# Patient Record
Sex: FEMALE | Race: WHITE | NOT HISPANIC OR LATINO | Employment: UNEMPLOYED | URBAN - METROPOLITAN AREA
[De-identification: names, ages, dates, MRNs, and addresses within clinical notes are randomized per-mention and may not be internally consistent; named-entity substitution may affect disease eponyms.]

---

## 2018-12-10 ENCOUNTER — APPOINTMENT (OUTPATIENT)
Dept: RADIOLOGY | Facility: MEDICAL CENTER | Age: 48
DRG: 552 | End: 2018-12-10
Attending: EMERGENCY MEDICINE

## 2018-12-10 ENCOUNTER — HOSPITAL ENCOUNTER (INPATIENT)
Facility: MEDICAL CENTER | Age: 48
LOS: 7 days | DRG: 552 | End: 2018-12-17
Attending: EMERGENCY MEDICINE | Admitting: INTERNAL MEDICINE

## 2018-12-10 DIAGNOSIS — R29.898 WEAKNESS OF BOTH LOWER EXTREMITIES: ICD-10-CM

## 2018-12-10 DIAGNOSIS — E66.2 OBESITY HYPOVENTILATION SYNDROME (HCC): ICD-10-CM

## 2018-12-10 DIAGNOSIS — R09.02 HYPOXIA: ICD-10-CM

## 2018-12-10 DIAGNOSIS — Z86.718 HISTORY OF DVT (DEEP VEIN THROMBOSIS): ICD-10-CM

## 2018-12-10 DIAGNOSIS — R53.1 WEAKNESS: ICD-10-CM

## 2018-12-10 DIAGNOSIS — M48.062 SPINAL STENOSIS OF LUMBAR REGION WITH NEUROGENIC CLAUDICATION: ICD-10-CM

## 2018-12-10 DIAGNOSIS — G47.33 OBSTRUCTIVE SLEEP APNEA SYNDROME: ICD-10-CM

## 2018-12-10 PROBLEM — G47.00 INSOMNIA: Status: ACTIVE | Noted: 2018-12-10

## 2018-12-10 PROBLEM — I48.91 AF (ATRIAL FIBRILLATION) (HCC): Status: ACTIVE | Noted: 2018-12-10

## 2018-12-10 PROBLEM — E03.9 HYPOTHYROIDISM: Status: ACTIVE | Noted: 2018-12-10

## 2018-12-10 PROBLEM — F32.A DEPRESSION: Status: ACTIVE | Noted: 2018-12-10

## 2018-12-10 PROBLEM — M48.00 SPINAL STENOSIS: Status: ACTIVE | Noted: 2018-12-10

## 2018-12-10 LAB
ALBUMIN SERPL BCP-MCNC: 4.2 G/DL (ref 3.2–4.9)
ALBUMIN/GLOB SERPL: 1.5 G/DL
ALP SERPL-CCNC: 81 U/L (ref 30–99)
ALT SERPL-CCNC: 16 U/L (ref 2–50)
ANION GAP SERPL CALC-SCNC: 8 MMOL/L (ref 0–11.9)
AST SERPL-CCNC: 17 U/L (ref 12–45)
BASOPHILS # BLD AUTO: 1.1 % (ref 0–1.8)
BASOPHILS # BLD: 0.09 K/UL (ref 0–0.12)
BILIRUB SERPL-MCNC: 0.3 MG/DL (ref 0.1–1.5)
BUN SERPL-MCNC: 11 MG/DL (ref 8–22)
CALCIUM SERPL-MCNC: 9.7 MG/DL (ref 8.5–10.5)
CHLORIDE SERPL-SCNC: 104 MMOL/L (ref 96–112)
CO2 SERPL-SCNC: 27 MMOL/L (ref 20–33)
CREAT SERPL-MCNC: 0.78 MG/DL (ref 0.5–1.4)
EOSINOPHIL # BLD AUTO: 0.15 K/UL (ref 0–0.51)
EOSINOPHIL NFR BLD: 1.8 % (ref 0–6.9)
ERYTHROCYTE [DISTWIDTH] IN BLOOD BY AUTOMATED COUNT: 44.3 FL (ref 35.9–50)
GLOBULIN SER CALC-MCNC: 2.8 G/DL (ref 1.9–3.5)
GLUCOSE SERPL-MCNC: 105 MG/DL (ref 65–99)
HCT VFR BLD AUTO: 42 % (ref 37–47)
HCT VFR BLD AUTO: 43.6 % (ref 37–47)
HGB BLD-MCNC: 13.7 G/DL (ref 12–16)
HGB BLD-MCNC: 13.9 G/DL (ref 12–16)
IMM GRANULOCYTES # BLD AUTO: 0.03 K/UL (ref 0–0.11)
IMM GRANULOCYTES NFR BLD AUTO: 0.4 % (ref 0–0.9)
LYMPHOCYTES # BLD AUTO: 1.73 K/UL (ref 1–4.8)
LYMPHOCYTES NFR BLD: 20.4 % (ref 22–41)
MAGNESIUM SERPL-MCNC: 1.7 MG/DL (ref 1.5–2.5)
MCH RBC QN AUTO: 29.8 PG (ref 27–33)
MCHC RBC AUTO-ENTMCNC: 31.9 G/DL (ref 33.6–35)
MCV RBC AUTO: 93.6 FL (ref 81.4–97.8)
MONOCYTES # BLD AUTO: 0.65 K/UL (ref 0–0.85)
MONOCYTES NFR BLD AUTO: 7.7 % (ref 0–13.4)
NEUTROPHILS # BLD AUTO: 5.82 K/UL (ref 2–7.15)
NEUTROPHILS NFR BLD: 68.6 % (ref 44–72)
NRBC # BLD AUTO: 0 K/UL
NRBC BLD-RTO: 0 /100 WBC
PLATELET # BLD AUTO: 279 K/UL (ref 164–446)
PMV BLD AUTO: 9.6 FL (ref 9–12.9)
POTASSIUM SERPL-SCNC: 3.8 MMOL/L (ref 3.6–5.5)
PROT SERPL-MCNC: 7 G/DL (ref 6–8.2)
RBC # BLD AUTO: 4.66 M/UL (ref 4.2–5.4)
SODIUM SERPL-SCNC: 139 MMOL/L (ref 135–145)
TSH SERPL DL<=0.005 MIU/L-ACNC: 23.05 UIU/ML (ref 0.38–5.33)
WBC # BLD AUTO: 8.5 K/UL (ref 4.8–10.8)

## 2018-12-10 PROCEDURE — 83735 ASSAY OF MAGNESIUM: CPT

## 2018-12-10 PROCEDURE — 94760 N-INVAS EAR/PLS OXIMETRY 1: CPT

## 2018-12-10 PROCEDURE — 85014 HEMATOCRIT: CPT

## 2018-12-10 PROCEDURE — 85025 COMPLETE CBC W/AUTO DIFF WBC: CPT

## 2018-12-10 PROCEDURE — 700111 HCHG RX REV CODE 636 W/ 250 OVERRIDE (IP): Performed by: STUDENT IN AN ORGANIZED HEALTH CARE EDUCATION/TRAINING PROGRAM

## 2018-12-10 PROCEDURE — 36415 COLL VENOUS BLD VENIPUNCTURE: CPT

## 2018-12-10 PROCEDURE — A9270 NON-COVERED ITEM OR SERVICE: HCPCS | Performed by: NEUROLOGICAL SURGERY

## 2018-12-10 PROCEDURE — 93005 ELECTROCARDIOGRAM TRACING: CPT | Performed by: EMERGENCY MEDICINE

## 2018-12-10 PROCEDURE — 700111 HCHG RX REV CODE 636 W/ 250 OVERRIDE (IP): Performed by: EMERGENCY MEDICINE

## 2018-12-10 PROCEDURE — 700102 HCHG RX REV CODE 250 W/ 637 OVERRIDE(OP): Performed by: STUDENT IN AN ORGANIZED HEALTH CARE EDUCATION/TRAINING PROGRAM

## 2018-12-10 PROCEDURE — 84443 ASSAY THYROID STIM HORMONE: CPT

## 2018-12-10 PROCEDURE — 99223 1ST HOSP IP/OBS HIGH 75: CPT | Mod: GC | Performed by: INTERNAL MEDICINE

## 2018-12-10 PROCEDURE — 700101 HCHG RX REV CODE 250: Performed by: NEUROLOGICAL SURGERY

## 2018-12-10 PROCEDURE — 96375 TX/PRO/DX INJ NEW DRUG ADDON: CPT

## 2018-12-10 PROCEDURE — A9270 NON-COVERED ITEM OR SERVICE: HCPCS | Performed by: STUDENT IN AN ORGANIZED HEALTH CARE EDUCATION/TRAINING PROGRAM

## 2018-12-10 PROCEDURE — 770001 HCHG ROOM/CARE - MED/SURG/GYN PRIV*

## 2018-12-10 PROCEDURE — 85018 HEMOGLOBIN: CPT

## 2018-12-10 PROCEDURE — 80053 COMPREHEN METABOLIC PANEL: CPT

## 2018-12-10 PROCEDURE — 96376 TX/PRO/DX INJ SAME DRUG ADON: CPT

## 2018-12-10 PROCEDURE — 72148 MRI LUMBAR SPINE W/O DYE: CPT

## 2018-12-10 PROCEDURE — 99285 EMERGENCY DEPT VISIT HI MDM: CPT

## 2018-12-10 PROCEDURE — 700102 HCHG RX REV CODE 250 W/ 637 OVERRIDE(OP): Performed by: NEUROLOGICAL SURGERY

## 2018-12-10 PROCEDURE — 71045 X-RAY EXAM CHEST 1 VIEW: CPT

## 2018-12-10 PROCEDURE — 96374 THER/PROPH/DIAG INJ IV PUSH: CPT

## 2018-12-10 RX ORDER — FLUOXETINE HYDROCHLORIDE 20 MG/1
60 CAPSULE ORAL DAILY
COMMUNITY
End: 2018-12-24

## 2018-12-10 RX ORDER — HYDROMORPHONE HYDROCHLORIDE 1 MG/ML
1 INJECTION, SOLUTION INTRAMUSCULAR; INTRAVENOUS; SUBCUTANEOUS ONCE
Status: COMPLETED | OUTPATIENT
Start: 2018-12-10 | End: 2018-12-10

## 2018-12-10 RX ORDER — ONDANSETRON 2 MG/ML
4 INJECTION INTRAMUSCULAR; INTRAVENOUS EVERY 4 HOURS PRN
Status: DISCONTINUED | OUTPATIENT
Start: 2018-12-10 | End: 2018-12-17 | Stop reason: HOSPADM

## 2018-12-10 RX ORDER — DIPHENHYDRAMINE HCL 25 MG
25 TABLET ORAL EVERY 6 HOURS PRN
Status: DISCONTINUED | OUTPATIENT
Start: 2018-12-10 | End: 2018-12-17 | Stop reason: HOSPADM

## 2018-12-10 RX ORDER — POLYETHYLENE GLYCOL 3350 17 G/17G
1 POWDER, FOR SOLUTION ORAL
Status: DISCONTINUED | OUTPATIENT
Start: 2018-12-10 | End: 2018-12-10

## 2018-12-10 RX ORDER — LORAZEPAM 2 MG/ML
1 INJECTION INTRAMUSCULAR ONCE
Status: COMPLETED | OUTPATIENT
Start: 2018-12-10 | End: 2018-12-10

## 2018-12-10 RX ORDER — CARISOPRODOL 350 MG/1
350 TABLET ORAL EVERY 8 HOURS PRN
Status: DISCONTINUED | OUTPATIENT
Start: 2018-12-10 | End: 2018-12-17 | Stop reason: HOSPADM

## 2018-12-10 RX ORDER — OXYCODONE HYDROCHLORIDE 5 MG/1
5 TABLET ORAL EVERY 8 HOURS PRN
Status: DISCONTINUED | OUTPATIENT
Start: 2018-12-10 | End: 2018-12-11

## 2018-12-10 RX ORDER — POLYETHYLENE GLYCOL 3350 17 G/17G
1 POWDER, FOR SOLUTION ORAL 2 TIMES DAILY PRN
Status: DISCONTINUED | OUTPATIENT
Start: 2018-12-10 | End: 2018-12-17 | Stop reason: HOSPADM

## 2018-12-10 RX ORDER — GABAPENTIN 300 MG/1
300 CAPSULE ORAL 3 TIMES DAILY
Status: DISCONTINUED | OUTPATIENT
Start: 2018-12-10 | End: 2018-12-11

## 2018-12-10 RX ORDER — SODIUM CHLORIDE AND POTASSIUM CHLORIDE 150; 900 MG/100ML; MG/100ML
INJECTION, SOLUTION INTRAVENOUS CONTINUOUS
Status: DISCONTINUED | OUTPATIENT
Start: 2018-12-10 | End: 2018-12-11

## 2018-12-10 RX ORDER — ENEMA 19; 7 G/133ML; G/133ML
1 ENEMA RECTAL
Status: DISCONTINUED | OUTPATIENT
Start: 2018-12-10 | End: 2018-12-17 | Stop reason: HOSPADM

## 2018-12-10 RX ORDER — ZOLPIDEM TARTRATE 10 MG/1
10 TABLET ORAL NIGHTLY PRN
Status: ON HOLD | COMMUNITY
End: 2018-12-17

## 2018-12-10 RX ORDER — ONDANSETRON 4 MG/1
4 TABLET, ORALLY DISINTEGRATING ORAL EVERY 8 HOURS PRN
COMMUNITY
End: 2018-12-26 | Stop reason: SDUPTHER

## 2018-12-10 RX ORDER — AMOXICILLIN 250 MG
1 CAPSULE ORAL
Status: DISCONTINUED | OUTPATIENT
Start: 2018-12-10 | End: 2018-12-17 | Stop reason: HOSPADM

## 2018-12-10 RX ORDER — LORAZEPAM 2 MG/ML
0.5 INJECTION INTRAMUSCULAR EVERY 6 HOURS PRN
Status: DISCONTINUED | OUTPATIENT
Start: 2018-12-10 | End: 2018-12-11

## 2018-12-10 RX ORDER — METHOCARBAMOL 750 MG/1
750 TABLET, FILM COATED ORAL EVERY 8 HOURS PRN
Status: DISCONTINUED | OUTPATIENT
Start: 2018-12-10 | End: 2018-12-10

## 2018-12-10 RX ORDER — PROMETHAZINE HYDROCHLORIDE 25 MG/1
12.5-25 TABLET ORAL EVERY 4 HOURS PRN
Status: DISCONTINUED | OUTPATIENT
Start: 2018-12-10 | End: 2018-12-17 | Stop reason: HOSPADM

## 2018-12-10 RX ORDER — DILTIAZEM HYDROCHLORIDE 60 MG/1
60 TABLET, FILM COATED ORAL 2 TIMES DAILY
COMMUNITY
End: 2018-12-26 | Stop reason: SDUPTHER

## 2018-12-10 RX ORDER — LEVOTHYROXINE SODIUM 175 UG/1
175 TABLET ORAL
COMMUNITY
End: 2018-12-26 | Stop reason: SDUPTHER

## 2018-12-10 RX ORDER — AMOXICILLIN 250 MG
1 CAPSULE ORAL NIGHTLY
Status: DISCONTINUED | OUTPATIENT
Start: 2018-12-10 | End: 2018-12-17 | Stop reason: HOSPADM

## 2018-12-10 RX ORDER — ENALAPRILAT 1.25 MG/ML
1.25 INJECTION INTRAVENOUS EVERY 6 HOURS PRN
Status: DISCONTINUED | OUTPATIENT
Start: 2018-12-10 | End: 2018-12-17 | Stop reason: HOSPADM

## 2018-12-10 RX ORDER — AMOXICILLIN 250 MG
2 CAPSULE ORAL 2 TIMES DAILY
Status: DISCONTINUED | OUTPATIENT
Start: 2018-12-10 | End: 2018-12-10

## 2018-12-10 RX ORDER — BISACODYL 10 MG
10 SUPPOSITORY, RECTAL RECTAL
Status: DISCONTINUED | OUTPATIENT
Start: 2018-12-10 | End: 2018-12-10

## 2018-12-10 RX ORDER — LORAZEPAM 1 MG/1
0.5 TABLET ORAL EVERY 6 HOURS PRN
Status: DISCONTINUED | OUTPATIENT
Start: 2018-12-10 | End: 2018-12-11

## 2018-12-10 RX ORDER — DILTIAZEM HYDROCHLORIDE 60 MG/1
60 TABLET, FILM COATED ORAL 2 TIMES DAILY
Status: DISCONTINUED | OUTPATIENT
Start: 2018-12-10 | End: 2018-12-17 | Stop reason: HOSPADM

## 2018-12-10 RX ORDER — DOCUSATE SODIUM 100 MG/1
100 CAPSULE, LIQUID FILLED ORAL 2 TIMES DAILY
Status: DISCONTINUED | OUTPATIENT
Start: 2018-12-10 | End: 2018-12-17 | Stop reason: HOSPADM

## 2018-12-10 RX ORDER — DIPHENHYDRAMINE HYDROCHLORIDE 50 MG/ML
25 INJECTION INTRAMUSCULAR; INTRAVENOUS EVERY 6 HOURS PRN
Status: DISCONTINUED | OUTPATIENT
Start: 2018-12-10 | End: 2018-12-17 | Stop reason: HOSPADM

## 2018-12-10 RX ORDER — ONDANSETRON 4 MG/1
4 TABLET, ORALLY DISINTEGRATING ORAL EVERY 4 HOURS PRN
Status: DISCONTINUED | OUTPATIENT
Start: 2018-12-10 | End: 2018-12-17 | Stop reason: HOSPADM

## 2018-12-10 RX ORDER — ZOLPIDEM TARTRATE 5 MG/1
10 TABLET ORAL NIGHTLY PRN
Status: DISCONTINUED | OUTPATIENT
Start: 2018-12-10 | End: 2018-12-17 | Stop reason: HOSPADM

## 2018-12-10 RX ORDER — BISACODYL 10 MG
10 SUPPOSITORY, RECTAL RECTAL
Status: DISCONTINUED | OUTPATIENT
Start: 2018-12-10 | End: 2018-12-17 | Stop reason: HOSPADM

## 2018-12-10 RX ORDER — CARISOPRODOL 350 MG/1
350 TABLET ORAL EVERY 8 HOURS PRN
COMMUNITY
End: 2018-12-31

## 2018-12-10 RX ORDER — OXYCODONE HCL 10 MG/1
10 TABLET, FILM COATED, EXTENDED RELEASE ORAL ONCE
Status: COMPLETED | OUTPATIENT
Start: 2018-12-10 | End: 2018-12-10

## 2018-12-10 RX ORDER — OXYCODONE HYDROCHLORIDE 20 MG/1
20 TABLET ORAL EVERY 6 HOURS PRN
Status: ON HOLD | COMMUNITY
End: 2018-12-17

## 2018-12-10 RX ORDER — FLUOXETINE HYDROCHLORIDE 20 MG/1
60 CAPSULE ORAL DAILY
Status: DISCONTINUED | OUTPATIENT
Start: 2018-12-11 | End: 2018-12-17 | Stop reason: HOSPADM

## 2018-12-10 RX ORDER — PROMETHAZINE HYDROCHLORIDE 12.5 MG/1
12.5-25 SUPPOSITORY RECTAL EVERY 4 HOURS PRN
Status: DISCONTINUED | OUTPATIENT
Start: 2018-12-10 | End: 2018-12-17 | Stop reason: HOSPADM

## 2018-12-10 RX ORDER — CALCIUM CARBONATE 500 MG/1
500 TABLET, CHEWABLE ORAL 2 TIMES DAILY
Status: DISCONTINUED | OUTPATIENT
Start: 2018-12-10 | End: 2018-12-17 | Stop reason: HOSPADM

## 2018-12-10 RX ORDER — LABETALOL HYDROCHLORIDE 5 MG/ML
10 INJECTION, SOLUTION INTRAVENOUS
Status: DISCONTINUED | OUTPATIENT
Start: 2018-12-10 | End: 2018-12-10

## 2018-12-10 RX ADMIN — ANTACID TABLETS 500 MG: 500 TABLET, CHEWABLE ORAL at 20:26

## 2018-12-10 RX ADMIN — CARISOPRODOL 350 MG: 350 TABLET ORAL at 20:26

## 2018-12-10 RX ADMIN — LORAZEPAM 1 MG: 2 INJECTION INTRAMUSCULAR; INTRAVENOUS at 15:00

## 2018-12-10 RX ADMIN — DILTIAZEM HYDROCHLORIDE 60 MG: 60 TABLET, FILM COATED ORAL at 20:41

## 2018-12-10 RX ADMIN — OXYCODONE HYDROCHLORIDE 5 MG: 5 TABLET ORAL at 20:26

## 2018-12-10 RX ADMIN — LORAZEPAM 1 MG: 2 INJECTION INTRAMUSCULAR; INTRAVENOUS at 13:15

## 2018-12-10 RX ADMIN — ENOXAPARIN SODIUM 120 MG: 150 INJECTION SUBCUTANEOUS at 20:41

## 2018-12-10 RX ADMIN — ZOLPIDEM TARTRATE 10 MG: 5 TABLET ORAL at 22:37

## 2018-12-10 RX ADMIN — HYDROMORPHONE HYDROCHLORIDE 1 MG: 1 INJECTION, SOLUTION INTRAMUSCULAR; INTRAVENOUS; SUBCUTANEOUS at 15:00

## 2018-12-10 RX ADMIN — HYDROMORPHONE HYDROCHLORIDE 1 MG: 1 INJECTION, SOLUTION INTRAMUSCULAR; INTRAVENOUS; SUBCUTANEOUS at 12:15

## 2018-12-10 RX ADMIN — POTASSIUM CHLORIDE AND SODIUM CHLORIDE: 900; 150 INJECTION, SOLUTION INTRAVENOUS at 20:29

## 2018-12-10 RX ADMIN — OXYCODONE HYDROCHLORIDE 10 MG: 10 TABLET, FILM COATED, EXTENDED RELEASE ORAL at 22:37

## 2018-12-10 ASSESSMENT — ENCOUNTER SYMPTOMS
VOMITING: 0
DOUBLE VISION: 0
BRUISES/BLEEDS EASILY: 0
FEVER: 0
COUGH: 0
DEPRESSION: 0
BLURRED VISION: 0
NAUSEA: 0
CHILLS: 1
HEADACHES: 0
FALLS: 0
BACK PAIN: 0
NERVOUS/ANXIOUS: 1
SHORTNESS OF BREATH: 0
PALPITATIONS: 0
SORE THROAT: 0
ABDOMINAL PAIN: 0
DIZZINESS: 0

## 2018-12-10 ASSESSMENT — PAIN SCALES - GENERAL
PAINLEVEL_OUTOF10: 8
PAINLEVEL_OUTOF10: 7

## 2018-12-10 ASSESSMENT — LIFESTYLE VARIABLES
ALCOHOL_USE: NO
EVER_SMOKED: NEVER

## 2018-12-10 NOTE — ED PROVIDER NOTES
"ED Provider Note    CHIEF COMPLAINT  Chief Complaint   Patient presents with   • Fall   • Low Back Pain       HPI  Fabi Najera is a 48 y.o. female who presents with back pain.  Patient is a history of chronic low back pain that she relates is related to spinal stenosis.  She historically has taken 20 mg of OxyContin 3 times daily for this pain.  She just moved from [place] and ran out of her pain medication about 2 weeks ago.  She has had increased pain since then.  She states that she also feels like she is having weakness in her extremities.  She says her legs periodically give out on her.  She most recently fell a couple days ago and hit her left side on the ground.  She complains of left rib pain from this.  She denies difficult he breathing.  She denies numbness in the extremities.  No bowel or bladder dysfunction or saddle anesthesia.  He has not had a fever.  She does not inject drugs.  States she is having a hard time getting around at home.    REVIEW OF SYSTEMS  As per HPI, otherwise a 10 point review of systems is negative    PAST MEDICAL HISTORY  CHF, atrial fibrillation, neuropathy, chronic back pain, spinal stenosis    SOCIAL HISTORY  Social History   Substance Use Topics   • Smoking status: Never Smoker   • Smokeless tobacco: Never Used   • Alcohol use No       SURGICAL HISTORY  Past Surgical History:   Procedure Laterality Date   • APPENDECTOMY     • CHOLECYSTECTOMY     • GYN SURGERY      partial hysterectomy       CURRENT MEDICATIONS  She has been out of all of her medications including Xarelto and oxycodone.    ALLERGIES  Allergies   Allergen Reactions   • Cillins [Penicillins]      swollen   • Toradol      welts   • Tramadol      welts   • Tylenol      welts       PHYSICAL EXAM  VITAL SIGNS: /91   Pulse 86   Temp 36.7 °C (98 °F) (Temporal)   Resp 16   Ht 1.651 m (5' 5\")   Wt (!) 142 kg (313 lb)   SpO2 95%   BMI 52.09 kg/m²    Constitutional: Awake and alert  HENT:  Atraumatic, " Normocephalic.Oropharynx moist mucus membranes, Nose normal inspection.   Eyes: Normal inspection  Neck: Supple  Cardiovascular: Normal heart rate, Normal rhythm.  Symmetric peripheral pulses.   Thorax & Lungs: No respiratory distress, No wheezing, No rales, No rhonchi, left-sided chest wall tenderness  Abdomen: Bowel sounds normal, soft, non-distended, nontender, no mass  Skin: Warm, Dry, No rash.   Back: Diffuse mid and lower lumbar tenderness.  No focal tenderness, step-off or deformity.  No overlying rash.  Extremities: Well perfused  Neurologic: She is awake and alert and oriented.  Symmetric motor and sensory in the upper extremities.  Normal sensory in the lower externally's.  She has weakness on plantarflexion, dorsiflexion of the left foot as well as weakness of quadriceps and hamstring function.  There is no clonus.  She has symmetric DTRs at patellas.      RADIOLOGY/PROCEDURES  MR-LUMBAR SPINE-W/O   Final Result   Addendum 1 of 1   Addendum: The study was performed on the 3T MRI scanner.      At the level of L2-3, there is right foraminal disc protrusion causing    stenosis of the inferior portion of right L2 neural foramina. The right L2    nerve root might have been impinged at the neural foramina.      Final      1.  There is mild anterolisthesis of L4 on 5.   2.  There are combinations of facet joint arthropathy and ligamentum flavum hypertrophy causing mild central canal stenosis at L4-5.      DX-CHEST-PORTABLE (1 VIEW)   Final Result      No acute cardiac or pulmonary abnormalities are identified.      CT-LSPINE W/O PLUS RECONS    (Results Pending)        Imaging is interpreted by radiologist    Labs:  Results for orders placed or performed during the hospital encounter of 12/10/18   CBC WITH DIFFERENTIAL   Result Value Ref Range    WBC 8.5 4.8 - 10.8 K/uL    RBC 4.66 4.20 - 5.40 M/uL    Hemoglobin 13.9 12.0 - 16.0 g/dL    Hematocrit 43.6 37.0 - 47.0 %    MCV 93.6 81.4 - 97.8 fL    MCH 29.8 27.0 -  33.0 pg    MCHC 31.9 (L) 33.6 - 35.0 g/dL    RDW 44.3 35.9 - 50.0 fL    Platelet Count 279 164 - 446 K/uL    MPV 9.6 9.0 - 12.9 fL    Neutrophils-Polys 68.60 44.00 - 72.00 %    Lymphocytes 20.40 (L) 22.00 - 41.00 %    Monocytes 7.70 0.00 - 13.40 %    Eosinophils 1.80 0.00 - 6.90 %    Basophils 1.10 0.00 - 1.80 %    Immature Granulocytes 0.40 0.00 - 0.90 %    Nucleated RBC 0.00 /100 WBC    Neutrophils (Absolute) 5.82 2.00 - 7.15 K/uL    Lymphs (Absolute) 1.73 1.00 - 4.80 K/uL    Monos (Absolute) 0.65 0.00 - 0.85 K/uL    Eos (Absolute) 0.15 0.00 - 0.51 K/uL    Baso (Absolute) 0.09 0.00 - 0.12 K/uL    Immature Granulocytes (abs) 0.03 0.00 - 0.11 K/uL    NRBC (Absolute) 0.00 K/uL   COMP METABOLIC PANEL   Result Value Ref Range    Sodium 139 135 - 145 mmol/L    Potassium 3.8 3.6 - 5.5 mmol/L    Chloride 104 96 - 112 mmol/L    Co2 27 20 - 33 mmol/L    Anion Gap 8.0 0.0 - 11.9    Glucose 105 (H) 65 - 99 mg/dL    Bun 11 8 - 22 mg/dL    Creatinine 0.78 0.50 - 1.40 mg/dL    Calcium 9.7 8.5 - 10.5 mg/dL    AST(SGOT) 17 12 - 45 U/L    ALT(SGPT) 16 2 - 50 U/L    Alkaline Phosphatase 81 30 - 99 U/L    Total Bilirubin 0.3 0.1 - 1.5 mg/dL    Albumin 4.2 3.2 - 4.9 g/dL    Total Protein 7.0 6.0 - 8.2 g/dL    Globulin 2.8 1.9 - 3.5 g/dL    A-G Ratio 1.5 g/dL   ESTIMATED GFR   Result Value Ref Range    GFR If African American >60 >60 mL/min/1.73 m 2    GFR If Non African American >60 >60 mL/min/1.73 m 2       Medications   HYDROmorphone pf (DILAUDID) injection 1 mg (not administered)   HYDROmorphone pf (DILAUDID) injection 1 mg (1 mg Intravenous Given 12/10/18 1215)   LORazepam (ATIVAN) injection 1 mg (1 mg Intravenous Given 12/10/18 1315)   LORazepam (ATIVAN) injection 1 mg (1 mg Intravenous Given 12/10/18 1500)       COURSE & MEDICAL DECISION MAKING  Patient presents to the ER with back pain and weakness in the left lower extremity.  She appears to have significant deficits on examination.  She has a history of chronic back pain, but  states that her leg has not been this week before.  Because of this MRI was ordered.  Laboratory data was ordered.  Laboratory was unremarkable.  Patient was given Dilaudid for pain.  She was anxious prior to MRI and was given Ativan IV.  Unfortunately she did not fit in the standard MRI.  The initial imaging had to be aborted.  I spoke with Dr. Ellis who is advised either outpatient open MRI or CT myelogram.  Made arrangements for her to go to outpatient MRI as it can tolerate larger patients.     MRI returned.  Patient does have stenosis but nothing that requires immediate intervention.  Dr. Ellis reviewed the images as well.  Unfortunately the patient is unable to ambulate with a steady gait because of weakness in the leg.  Seems extremely unlikely that this would be a CVA given her complaints of back pain associated with this weakness.  She will need to be admitted to the hospital for further workup and treatment.  I discussed case with the hospitalist who defers to the [facility] service.  I paged the [facility] for admission.    FINAL IMPRESSION  1.  Weakness, left lower extremity  2.  Low back pain    This dictation was created using voice recognition software. The accuracy of the dictation is limited to the abilities of the software.  The nursing notes were reviewed and certain aspects of this information were incorporated into this note.      Electronically signed by: Marck Martinez, 12/10/2018 3:49 PM

## 2018-12-10 NOTE — ED TRIAGE NOTES
Wheeled to triage  Chief Complaint   Patient presents with   • Fall   • Low Back Pain     [place] moved [place], does not have a PCP yet, hx of spinal stenosis and neuropathy, has fallen 10 times in the last week because her legs keep giving out.  Has a cane at home, does not use a walker.

## 2018-12-11 ENCOUNTER — APPOINTMENT (OUTPATIENT)
Dept: RADIOLOGY | Facility: MEDICAL CENTER | Age: 48
DRG: 552 | End: 2018-12-11
Attending: NEUROLOGICAL SURGERY

## 2018-12-11 PROBLEM — R29.898 LEG WEAKNESS: Status: ACTIVE | Noted: 2018-12-10

## 2018-12-11 LAB
ALBUMIN SERPL BCP-MCNC: 3.6 G/DL (ref 3.2–4.9)
ALBUMIN/GLOB SERPL: 1.4 G/DL
ALP SERPL-CCNC: 74 U/L (ref 30–99)
ALT SERPL-CCNC: 15 U/L (ref 2–50)
ANION GAP SERPL CALC-SCNC: 8 MMOL/L (ref 0–11.9)
AST SERPL-CCNC: 14 U/L (ref 12–45)
BASOPHILS # BLD AUTO: 1 % (ref 0–1.8)
BASOPHILS # BLD: 0.08 K/UL (ref 0–0.12)
BILIRUB SERPL-MCNC: 0.3 MG/DL (ref 0.1–1.5)
BUN SERPL-MCNC: 12 MG/DL (ref 8–22)
CALCIUM SERPL-MCNC: 9.4 MG/DL (ref 8.5–10.5)
CHLORIDE SERPL-SCNC: 106 MMOL/L (ref 96–112)
CK SERPL-CCNC: 63 U/L (ref 0–154)
CO2 SERPL-SCNC: 29 MMOL/L (ref 20–33)
CREAT SERPL-MCNC: 0.83 MG/DL (ref 0.5–1.4)
EOSINOPHIL # BLD AUTO: 0.19 K/UL (ref 0–0.51)
EOSINOPHIL NFR BLD: 2.4 % (ref 0–6.9)
ERYTHROCYTE [DISTWIDTH] IN BLOOD BY AUTOMATED COUNT: 42.9 FL (ref 35.9–50)
GLOBULIN SER CALC-MCNC: 2.5 G/DL (ref 1.9–3.5)
GLUCOSE SERPL-MCNC: 108 MG/DL (ref 65–99)
HCT VFR BLD AUTO: 39.3 % (ref 37–47)
HCT VFR BLD AUTO: 39.4 % (ref 37–47)
HGB BLD-MCNC: 12.8 G/DL (ref 12–16)
HGB BLD-MCNC: 13.2 G/DL (ref 12–16)
IMM GRANULOCYTES # BLD AUTO: 0.02 K/UL (ref 0–0.11)
IMM GRANULOCYTES NFR BLD AUTO: 0.3 % (ref 0–0.9)
INR PPP: 1.39 (ref 0.87–1.13)
LYMPHOCYTES # BLD AUTO: 2.63 K/UL (ref 1–4.8)
LYMPHOCYTES NFR BLD: 33.8 % (ref 22–41)
MCH RBC QN AUTO: 30.6 PG (ref 27–33)
MCHC RBC AUTO-ENTMCNC: 33.5 G/DL (ref 33.6–35)
MCV RBC AUTO: 91.2 FL (ref 81.4–97.8)
MONOCYTES # BLD AUTO: 0.66 K/UL (ref 0–0.85)
MONOCYTES NFR BLD AUTO: 8.5 % (ref 0–13.4)
NEUTROPHILS # BLD AUTO: 4.2 K/UL (ref 2–7.15)
NEUTROPHILS NFR BLD: 54 % (ref 44–72)
NRBC # BLD AUTO: 0 K/UL
NRBC BLD-RTO: 0 /100 WBC
PLATELET # BLD AUTO: 241 K/UL (ref 164–446)
PMV BLD AUTO: 9.7 FL (ref 9–12.9)
POTASSIUM SERPL-SCNC: 3.9 MMOL/L (ref 3.6–5.5)
PROT SERPL-MCNC: 6.1 G/DL (ref 6–8.2)
PROTHROMBIN TIME: 17.2 SEC (ref 12–14.6)
RBC # BLD AUTO: 4.32 M/UL (ref 4.2–5.4)
SODIUM SERPL-SCNC: 143 MMOL/L (ref 135–145)
WBC # BLD AUTO: 7.8 K/UL (ref 4.8–10.8)

## 2018-12-11 PROCEDURE — 99232 SBSQ HOSP IP/OBS MODERATE 35: CPT | Mod: GC | Performed by: INTERNAL MEDICINE

## 2018-12-11 PROCEDURE — 97162 PT EVAL MOD COMPLEX 30 MIN: CPT

## 2018-12-11 PROCEDURE — 700111 HCHG RX REV CODE 636 W/ 250 OVERRIDE (IP): Performed by: NEUROLOGICAL SURGERY

## 2018-12-11 PROCEDURE — 97535 SELF CARE MNGMENT TRAINING: CPT

## 2018-12-11 PROCEDURE — 72100 X-RAY EXAM L-S SPINE 2/3 VWS: CPT

## 2018-12-11 PROCEDURE — 82550 ASSAY OF CK (CPK): CPT

## 2018-12-11 PROCEDURE — 770001 HCHG ROOM/CARE - MED/SURG/GYN PRIV*

## 2018-12-11 PROCEDURE — 700102 HCHG RX REV CODE 250 W/ 637 OVERRIDE(OP): Performed by: NEUROLOGICAL SURGERY

## 2018-12-11 PROCEDURE — 85014 HEMATOCRIT: CPT

## 2018-12-11 PROCEDURE — 80053 COMPREHEN METABOLIC PANEL: CPT

## 2018-12-11 PROCEDURE — G8988 SELF CARE GOAL STATUS: HCPCS | Mod: CI

## 2018-12-11 PROCEDURE — G8989 SELF CARE D/C STATUS: HCPCS | Mod: CI

## 2018-12-11 PROCEDURE — G8987 SELF CARE CURRENT STATUS: HCPCS | Mod: CI

## 2018-12-11 PROCEDURE — A9270 NON-COVERED ITEM OR SERVICE: HCPCS | Performed by: STUDENT IN AN ORGANIZED HEALTH CARE EDUCATION/TRAINING PROGRAM

## 2018-12-11 PROCEDURE — A9270 NON-COVERED ITEM OR SERVICE: HCPCS | Performed by: NEUROLOGICAL SURGERY

## 2018-12-11 PROCEDURE — 85610 PROTHROMBIN TIME: CPT

## 2018-12-11 PROCEDURE — 700112 HCHG RX REV CODE 229: Performed by: NEUROLOGICAL SURGERY

## 2018-12-11 PROCEDURE — 85018 HEMOGLOBIN: CPT

## 2018-12-11 PROCEDURE — 85025 COMPLETE CBC W/AUTO DIFF WBC: CPT

## 2018-12-11 PROCEDURE — 700102 HCHG RX REV CODE 250 W/ 637 OVERRIDE(OP): Performed by: STUDENT IN AN ORGANIZED HEALTH CARE EDUCATION/TRAINING PROGRAM

## 2018-12-11 PROCEDURE — 97165 OT EVAL LOW COMPLEX 30 MIN: CPT

## 2018-12-11 PROCEDURE — G8978 MOBILITY CURRENT STATUS: HCPCS | Mod: CK

## 2018-12-11 PROCEDURE — G8980 MOBILITY D/C STATUS: HCPCS | Mod: CK

## 2018-12-11 PROCEDURE — G8979 MOBILITY GOAL STATUS: HCPCS | Mod: CI

## 2018-12-11 PROCEDURE — 36415 COLL VENOUS BLD VENIPUNCTURE: CPT

## 2018-12-11 PROCEDURE — A9270 NON-COVERED ITEM OR SERVICE: HCPCS | Performed by: INTERNAL MEDICINE

## 2018-12-11 PROCEDURE — 700102 HCHG RX REV CODE 250 W/ 637 OVERRIDE(OP): Performed by: INTERNAL MEDICINE

## 2018-12-11 PROCEDURE — 700111 HCHG RX REV CODE 636 W/ 250 OVERRIDE (IP): Performed by: STUDENT IN AN ORGANIZED HEALTH CARE EDUCATION/TRAINING PROGRAM

## 2018-12-11 RX ORDER — OXYCODONE HYDROCHLORIDE 10 MG/1
20 TABLET ORAL EVERY 8 HOURS PRN
Status: DISCONTINUED | OUTPATIENT
Start: 2018-12-11 | End: 2018-12-12

## 2018-12-11 RX ORDER — OXYCODONE HYDROCHLORIDE 10 MG/1
10 TABLET ORAL
Status: COMPLETED | OUTPATIENT
Start: 2018-12-11 | End: 2018-12-11

## 2018-12-11 RX ADMIN — ANTACID TABLETS 500 MG: 500 TABLET, CHEWABLE ORAL at 16:35

## 2018-12-11 RX ADMIN — CARISOPRODOL 350 MG: 350 TABLET ORAL at 04:57

## 2018-12-11 RX ADMIN — OXYCODONE HYDROCHLORIDE 20 MG: 10 TABLET ORAL at 09:06

## 2018-12-11 RX ADMIN — OXYCODONE HYDROCHLORIDE 10 MG: 10 TABLET ORAL at 12:40

## 2018-12-11 RX ADMIN — ENOXAPARIN SODIUM 120 MG: 150 INJECTION SUBCUTANEOUS at 16:35

## 2018-12-11 RX ADMIN — STANDARDIZED SENNA CONCENTRATE AND DOCUSATE SODIUM 1 TABLET: 8.6; 5 TABLET, FILM COATED ORAL at 20:44

## 2018-12-11 RX ADMIN — ZOLPIDEM TARTRATE 10 MG: 5 TABLET ORAL at 20:44

## 2018-12-11 RX ADMIN — CARISOPRODOL 350 MG: 350 TABLET ORAL at 15:14

## 2018-12-11 RX ADMIN — CARISOPRODOL 350 MG: 350 TABLET ORAL at 23:38

## 2018-12-11 RX ADMIN — ONDANSETRON 4 MG: 4 TABLET, ORALLY DISINTEGRATING ORAL at 22:43

## 2018-12-11 RX ADMIN — DILTIAZEM HYDROCHLORIDE 60 MG: 60 TABLET, FILM COATED ORAL at 17:55

## 2018-12-11 RX ADMIN — OXYCODONE HYDROCHLORIDE 20 MG: 10 TABLET ORAL at 17:38

## 2018-12-11 RX ADMIN — ENOXAPARIN SODIUM 120 MG: 150 INJECTION SUBCUTANEOUS at 04:57

## 2018-12-11 RX ADMIN — DOCUSATE SODIUM 100 MG: 100 CAPSULE, LIQUID FILLED ORAL at 16:35

## 2018-12-11 RX ADMIN — VITAMIN D, TAB 1000IU (100/BT) 5000 UNITS: 25 TAB at 04:56

## 2018-12-11 RX ADMIN — OXYCODONE HYDROCHLORIDE 5 MG: 5 TABLET ORAL at 04:57

## 2018-12-11 RX ADMIN — ANTACID TABLETS 500 MG: 500 TABLET, CHEWABLE ORAL at 04:57

## 2018-12-11 RX ADMIN — FLUOXETINE HYDROCHLORIDE 60 MG: 20 CAPSULE ORAL at 04:57

## 2018-12-11 RX ADMIN — LEVOTHYROXINE SODIUM 175 MCG: 150 TABLET ORAL at 04:57

## 2018-12-11 RX ADMIN — DILTIAZEM HYDROCHLORIDE 60 MG: 60 TABLET, FILM COATED ORAL at 04:57

## 2018-12-11 ASSESSMENT — ENCOUNTER SYMPTOMS
NERVOUS/ANXIOUS: 0
SHORTNESS OF BREATH: 0
BACK PAIN: 1
NAUSEA: 0
FEVER: 0
DIZZINESS: 0
HEADACHES: 0
DEPRESSION: 0
DOUBLE VISION: 0
VOMITING: 0
CHILLS: 0
FALLS: 1
SORE THROAT: 0
COUGH: 0
BRUISES/BLEEDS EASILY: 0
BLURRED VISION: 0

## 2018-12-11 ASSESSMENT — COGNITIVE AND FUNCTIONAL STATUS - GENERAL
MOVING FROM LYING ON BACK TO SITTING ON SIDE OF FLAT BED: A LITTLE
MOBILITY SCORE: 12
SUGGESTED CMS G CODE MODIFIER DAILY ACTIVITY: CI
TURNING FROM BACK TO SIDE WHILE IN FLAT BAD: UNABLE
MOVING TO AND FROM BED TO CHAIR: A LOT
SUGGESTED CMS G CODE MODIFIER MOBILITY: CL
STANDING UP FROM CHAIR USING ARMS: A LOT
DRESSING REGULAR LOWER BODY CLOTHING: A LITTLE
DAILY ACTIVITIY SCORE: 23
CLIMB 3 TO 5 STEPS WITH RAILING: A LOT
WALKING IN HOSPITAL ROOM: A LOT

## 2018-12-11 ASSESSMENT — PAIN SCALES - GENERAL
PAINLEVEL_OUTOF10: 6
PAINLEVEL_OUTOF10: 7
PAINLEVEL_OUTOF10: 7
PAINLEVEL_OUTOF10: 6
PAINLEVEL_OUTOF10: 4
PAINLEVEL_OUTOF10: 7
PAINLEVEL_OUTOF10: 5
PAINLEVEL_OUTOF10: 8
PAINLEVEL_OUTOF10: 5
PAINLEVEL_OUTOF10: 6

## 2018-12-11 ASSESSMENT — GAIT ASSESSMENTS
DISTANCE (FEET): 40
ASSISTIVE DEVICE: FRONT WHEEL WALKER
GAIT LEVEL OF ASSIST: CONTACT GUARD ASSIST

## 2018-12-11 ASSESSMENT — ACTIVITIES OF DAILY LIVING (ADL): TOILETING: INDEPENDENT

## 2018-12-11 NOTE — PROGRESS NOTES
Internal Medicine Interval Note  Note Author: Ev Whitehead M.D.     Name Fabi Najera       1970   Age/Sex 48 y.o. female   MRN [id number]   Code Status FULL     After 5PM or if no immediate response to page, please call for cross-coverage  Attending/Team: Phoebe/Adam See Patient List for primary contact information  Call [phone] to page    1st Call - Day Intern (R1):   [facility] 2nd Call - Day Sr. Resident (R2/R3):   Eduardo         Reason for interval visit  (Principal Problem)   Progressive weakness      Interval Problem Daily Status Update  (24 hours, problem oriented, brief subjective history, new lab/imaging data pertinent to that problem)   - PT today recommends outpatient or home health  - Discussed with patient that neurosurgery does not want to do intervention right now given her weight. Will need home health physical therapy and pain management after discharge.     Review of Systems   Constitutional: Negative for chills and fever.   HENT: Negative for congestion and sore throat.    Eyes: Negative for blurred vision and double vision.   Respiratory: Negative for cough and shortness of breath.    Cardiovascular: Positive for leg swelling. Negative for chest pain.   Gastrointestinal: Negative for nausea and vomiting.   Genitourinary: Negative for dysuria and urgency.   Musculoskeletal: Positive for back pain and falls.   Skin: Negative for itching and rash.   Neurological: Negative for dizziness and headaches.   Endo/Heme/Allergies: Negative for environmental allergies. Does not bruise/bleed easily.   Psychiatric/Behavioral: Negative for depression. The patient is not nervous/anxious.        Disposition/Barriers to discharge:   None    Consultants/Specialty  Neurosurgery    PCP: No primary care provider on file.      Quality Measures  Quality-Core Measures   Reviewed items::  Labs reviewed and Medications reviewed  DVT prophylaxis pharmacological::  Enoxaparin  (Lovenox)          Physical Exam       Vitals:    12/11/18 0000 12/11/18 0400 12/11/18 0810 12/11/18 0842   BP: 107/70 110/68 (!) 86/52 111/73   Pulse: 88 85 80 85   Resp: 16 16 16    Temp: 36.7 °C (98 °F) 36.7 °C (98.1 °F) 36.8 °C (98.3 °F)    TempSrc: Temporal Temporal Temporal    SpO2: 92% 91% 91% 96%   Weight:       Height:         Body mass index is 52.09 kg/m².   Oxygen Therapy:  Pulse Oximetry: 96 %, O2 (LPM): 0, O2 Delivery: None (Room Air)    Physical Exam   Constitutional: She is oriented to person, place, and time and well-developed, well-nourished, and in no distress.   Morbidly obese   HENT:   Head: Normocephalic and atraumatic.   Mouth/Throat: Oropharynx is clear and moist.   Eyes: Pupils are equal, round, and reactive to light. Conjunctivae and EOM are normal.   Neck: Normal range of motion. Neck supple.   Cardiovascular: Normal rate, regular rhythm, normal heart sounds and intact distal pulses.    Pulmonary/Chest: Effort normal and breath sounds normal.   Abdominal: Soft. Bowel sounds are normal.   Musculoskeletal: Normal range of motion.   Neurological: She is alert and oriented to person, place, and time.   3/5 strength in LLE, 4/5 strength in RLE. Numbness up to upper thigh L > R   Skin: Skin is warm and dry.   Psychiatric: Mood, memory, affect and judgment normal.             Assessment/Plan     Leg weakness   Assessment & Plan    Presented after ground level fall. Back pain and leg weakness and neuropathy with findings of L4-L5 spinal stenosis on MRI. Neurosurgery consulted, but not planning on surgery given her weight and leg weakness more related to her prior CVA and given her stenosis is mild may not warrant surgery.  - PT recommends home health, will try to arrange this  - Fall precautions  - Continue home oxycodone 20mg q8h PRN  - Follow up with neurosurgery after discharge     Depression   Assessment & Plan    Continue home fluoxetine 60mg     Insomnia   Assessment & Plan    Continue home  zolpidem 10mg     Hypothyroidism   Assessment & Plan    Continue home levothyroxine 125mcg     History of DVT (deep vein thrombosis)   Assessment & Plan    Takes xarelto.   - Lovenox 120mg BID     AF (atrial fibrillation) (HCC)   Assessment & Plan    Continue home diltiazem 60mg BID

## 2018-12-11 NOTE — PROGRESS NOTES
Bedside rounding complete. Patient a & o x 4. Pt states pain 6/10. [employer] resident at bedside. [facility] resident made aware that pt has an allergy to Gabapentin and that the patient takes  Oxycodone 20mg q6hrs. MD will talk with her staff. PT/OT ordered.     Bed alarm on, upper side rails up, bed locked and in lowest position. Call bell and belongings within reach. Communication board updated and plan of care discussed with the patient. Patient educated on hourly rounding.

## 2018-12-11 NOTE — H&P
"      Internal Medicine Admitting History and Physical    Note Author: Ev Whitehead M.D.       Name Fabi Najera       1970   Age/Sex 48 y.o. female   MRN [id number]   Code Status FULL     After 5PM or if no immediate response to page, please call for cross-coverage  Attending/Team: Phoebe/Adam See Patient List for primary contact information  Call [phone] to page    1st Call - Day Intern (R1):   [facility] 2nd Call - Day Sr. Resident (R2/R3):   Eduardo       Chief Complaint:   GLF    HPI:  Ms. Najera is a 47 yo female who presents with low back pain after slipping in her shower. She fell on her left side, hitting her left shoulder and left chest. She has a history of chronic low back pain related to spinal stenosis. She says that she has been falling more frequently in the last 2 weeks. No dizziness or syncope, she feels that her legs just \"give out\" due to numbness and weakness. No bladder or bowel incontinence, no saddle anesthesia. No fevers. She initially had chest pain on the left side of her chest that she describes as painful to palpation and when turning to the left. She uses a walker to ambulate. Only a year ago she was able to ambulate on her own.     She just moved to [place] from [facility]. She is a never-smoker, only rare alcohol use all her life, no other drugs.    In the ED, she was afebrile with vitals wnl. EKG showed sinus rhythm with RAD. MRI with L4-L5 stenosis. Neurosurgery consulted, will consider possible intervention tomorrow.    Review of Systems   Constitutional: Positive for chills. Negative for fever.   HENT: Negative for congestion and sore throat.    Eyes: Negative for blurred vision and double vision.   Respiratory: Negative for cough and shortness of breath.    Cardiovascular: Positive for chest pain. Negative for palpitations.   Gastrointestinal: Negative for abdominal pain, nausea and vomiting.   Genitourinary: Negative for dysuria and urgency. "   Musculoskeletal: Negative for back pain and falls.   Skin: Negative for itching and rash.   Neurological: Negative for dizziness and headaches.   Endo/Heme/Allergies: Negative for environmental allergies. Does not bruise/bleed easily.   Psychiatric/Behavioral: Negative for depression. The patient is nervous/anxious.              Past Medical History (Chronic medical problem, known complications and current treatment)    DVT/PE 2016, on xarelto  Afib, on diltiazem 60mg  Grave's disease s/p , on levothyroxine 175mcg  R CVA 10/2018, improving L sided weakness  Insomnia, on zolpidem 10mg  Depression, on fluoxetine 20mg    Past Surgical History:  Past Surgical History:   Procedure Laterality Date   • APPENDECTOMY     • CHOLECYSTECTOMY     • GYN SURGERY      partial hysterectomy       Current Outpatient Medications:  Home Medications     Reviewed by Ashlee Sotelo (Pharmacy Tech) on 12/10/18 at 1630  Med List Status: Complete   Medication Last Dose Status   carisoprodol (SOMA) 350 MG Tab 12/9/2018 Active   DILTIAZem (CARDIZEM) 60 MG Tab 12/10/2018 Active   FLUoxetine (PROZAC) 20 MG Cap 12/10/2018 Active   levothyroxine (SYNTHROID) 175 MCG Tab 12/10/2018 Active   ondansetron (ZOFRAN ODT) 4 MG TABLET DISPERSIBLE 12/9/2018 Active   oxyCODONE immediate-release (ROXICODONE) 5 MG Tab 12/9/2018 Active   zolpidem (AMBIEN) 10 MG Tab 12/9/2018 Active                Medication Allergy/Sensitivities:  Allergies   Allergen Reactions   • Cillins [Penicillins]      swollen   • Toradol      welts   • Tramadol      welts   • Tylenol      welts         Family History (mandatory)   History reviewed. No pertinent family history.    Social History (mandatory)   Social History     Social History   • Marital status:      Spouse name: N/A   • Number of children: N/A   • Years of education: N/A     Occupational History   • Not on file.     Social History Main Topics   • Smoking status: Never Smoker   • Smokeless tobacco: Never Used  "  • Alcohol use No   • Drug use: No   • Sexual activity: Not on file     Other Topics Concern   • Not on file     Social History Narrative   • No narrative on file     Living situation: Lives with her daughter  PCP : No primary care provider on file.    Physical Exam     Vitals:    12/10/18 1057 12/10/18 1103   BP:  115/91   Pulse: 86    Resp: 16    Temp: 36.7 °C (98 °F)    TempSrc: Temporal    SpO2: 95%    Weight:  (!) 142 kg (313 lb)   Height: 1.651 m (5' 5\")      Body mass index is 52.09 kg/m².  /91   Pulse 86   Temp 36.7 °C (98 °F) (Temporal)   Resp 16   Ht 1.651 m (5' 5\")   Wt (!) 142 kg (313 lb)   SpO2 95%   BMI 52.09 kg/m²   O2 therapy: Pulse Oximetry: 95 %    Physical Exam   Constitutional: She is oriented to person, place, and time and well-developed, well-nourished, and in no distress.   Morbidly obese   HENT:   Head: Normocephalic and atraumatic.   Mouth/Throat: Oropharynx is clear and moist.   Eyes: Pupils are equal, round, and reactive to light. Conjunctivae and EOM are normal.   Neck: Normal range of motion. Neck supple.   Cardiovascular: Normal rate, regular rhythm, normal heart sounds and intact distal pulses.    Pulmonary/Chest: Effort normal and breath sounds normal.   Abdominal: Soft. Bowel sounds are normal.   Musculoskeletal: Normal range of motion.   Neurological: She is alert and oriented to person, place, and time.   3/5 strength in LLE, 4/5 strength in RLE. Numbness up to upper thigh on both sides.   Skin: Skin is warm and dry.   Psychiatric: Mood, memory, affect and judgment normal.         Data Review       Old Records Request:   Completed  Current Records review/summary: Completed    Lab Data Review:  Recent Results (from the past 24 hour(s))   CBC WITH DIFFERENTIAL    Collection Time: 12/10/18 12:00 PM   Result Value Ref Range    WBC 8.5 4.8 - 10.8 K/uL    RBC 4.66 4.20 - 5.40 M/uL    Hemoglobin 13.9 12.0 - 16.0 g/dL    Hematocrit 43.6 37.0 - 47.0 %    MCV 93.6 81.4 - 97.8 " fL    MCH 29.8 27.0 - 33.0 pg    MCHC 31.9 (L) 33.6 - 35.0 g/dL    RDW 44.3 35.9 - 50.0 fL    Platelet Count 279 164 - 446 K/uL    MPV 9.6 9.0 - 12.9 fL    Neutrophils-Polys 68.60 44.00 - 72.00 %    Lymphocytes 20.40 (L) 22.00 - 41.00 %    Monocytes 7.70 0.00 - 13.40 %    Eosinophils 1.80 0.00 - 6.90 %    Basophils 1.10 0.00 - 1.80 %    Immature Granulocytes 0.40 0.00 - 0.90 %    Nucleated RBC 0.00 /100 WBC    Neutrophils (Absolute) 5.82 2.00 - 7.15 K/uL    Lymphs (Absolute) 1.73 1.00 - 4.80 K/uL    Monos (Absolute) 0.65 0.00 - 0.85 K/uL    Eos (Absolute) 0.15 0.00 - 0.51 K/uL    Baso (Absolute) 0.09 0.00 - 0.12 K/uL    Immature Granulocytes (abs) 0.03 0.00 - 0.11 K/uL    NRBC (Absolute) 0.00 K/uL   COMP METABOLIC PANEL    Collection Time: 12/10/18 12:00 PM   Result Value Ref Range    Sodium 139 135 - 145 mmol/L    Potassium 3.8 3.6 - 5.5 mmol/L    Chloride 104 96 - 112 mmol/L    Co2 27 20 - 33 mmol/L    Anion Gap 8.0 0.0 - 11.9    Glucose 105 (H) 65 - 99 mg/dL    Bun 11 8 - 22 mg/dL    Creatinine 0.78 0.50 - 1.40 mg/dL    Calcium 9.7 8.5 - 10.5 mg/dL    AST(SGOT) 17 12 - 45 U/L    ALT(SGPT) 16 2 - 50 U/L    Alkaline Phosphatase 81 30 - 99 U/L    Total Bilirubin 0.3 0.1 - 1.5 mg/dL    Albumin 4.2 3.2 - 4.9 g/dL    Total Protein 7.0 6.0 - 8.2 g/dL    Globulin 2.8 1.9 - 3.5 g/dL    A-G Ratio 1.5 g/dL   ESTIMATED GFR    Collection Time: 12/10/18 12:00 PM   Result Value Ref Range    GFR If African American >60 >60 mL/min/1.73 m 2    GFR If Non African American >60 >60 mL/min/1.73 m 2   EKG    Collection Time: 12/10/18  5:02 PM   Result Value Ref Range    Report       [facility] Emergency Dept.    Test Date:  2018-12-10  Pt Name:    BRITTANIE DA SILVA          Department: ER  MRN:        [id number]                      Room:       [facility]  Gender:     Female                       Technician: [id number]  :        1970                   Requested By:JOE FARNSWORTH  Order #:    [id number]                     Reading MD:    Measurements  Intervals                                Axis  Rate:       91                           P:          38  NM:         152                          QRS:        99  QRSD:       88                           T:          33  QT:         384  QTc:        473    Interpretive Statements  SINUS RHYTHM  BORDERLINE RIGHT AXIS DEVIATION  No previous ECG available for comparison         Imaging/Procedures Review:    Independant Imaging Review: Completed  MR-LUMBAR SPINE-W/O   Final Result   Addendum 1 of 1   Addendum: The study was performed on the 3T MRI scanner.      At the level of L2-3, there is right foraminal disc protrusion causing    stenosis of the inferior portion of right L2 neural foramina. The right L2    nerve root might have been impinged at the neural foramina.      Final      1.  There is mild anterolisthesis of L4 on 5.   2.  There are combinations of facet joint arthropathy and ligamentum flavum hypertrophy causing mild central canal stenosis at L4-5.      DX-CHEST-PORTABLE (1 VIEW)   Final Result      No acute cardiac or pulmonary abnormalities are identified.      CT-LSPINE W/O PLUS RECONS    (Results Pending)   IR-NEURO INTERVENTIONAL CONSULT-IP    (Results Pending)        EKG:   EKG Independant Review: Completed  QTc:473, HR: 91, Normal Sinus Rhythm, no ST/T changes    Records reviewed and summarized in current documentation :  Yes  [employer] teaching service handout given to patient:  No         Assessment/Plan     Spinal stenosis   Assessment & Plan    Presented after ground level fall. Back pain and leg weakness and neuropathy with findings of L4-L5 spinal stenosis on MRI.   - Neurosurgery consulted for possible intervention  - Fall precautions  - Continue home oxycodone 5mg q8h PRN     Depression   Assessment & Plan    Continue home fluoxetine 60mg     Insomnia   Assessment & Plan    Continue home zolpidem 10mg     Hypothyroidism   Assessment & Plan    Continue home  levothyroxine 125mcg     History of DVT (deep vein thrombosis)   Assessment & Plan    Takes xarelto.   - Lovenox 120mg BID     AF (atrial fibrillation) (HCC)   Assessment & Plan    Continue home diltiazem 60mg BID         Anticipated Hospital stay:  >2 midnights        Quality Measures  Quality-Core Measures   Reviewed items::  EKG reviewed, Labs reviewed, Medications reviewed and Radiology images reviewed  Cortes catheter::  No Cortes  DVT prophylaxis pharmacological::  Enoxaparin (Lovenox)  DVT prophylaxis - mechanical:  SCDs    PCP: No primary care provider on file.

## 2018-12-11 NOTE — CARE PLAN
Problem: Safety  Goal: Will remain free from falls  Outcome: PROGRESSING AS EXPECTED  Assessed the patient for fall risk factors. Provided education regarding the need to call for assistance. Bed alarm in place, bed in lowest position, call light in reach.      Problem: Infection  Goal: Will remain free from infection  Outcome: PROGRESSING AS EXPECTED  Assessed pt for signs and symptoms of infection.  Educated pt regarding hand hygiene.  Foamed in/out.

## 2018-12-11 NOTE — CONSULTS
DATE OF SERVICE:  12/10/2018    REQUESTING PHYSICIAN:  Marck Martinez MD    REASON FOR CONSULTATION:  Sciatica and weakness.    HISTORY OF PRESENT ILLNESS:  The patient is a 48-year-old woman who presented   to the emergency room with back pain.  She has a history of chronic low back   pain, she is on narcotics for this.  She moved from [place].  She ran out of   her pain medication 2 weeks ago.  Since then, she has had increasing pain.    She also feels like she has weakness in the legs.  She says her legs give out   periodically.  She has had some falls as well.  She has had some rib pain as   well.  Interestingly, the history seems a little bit well out of the place.    When she saw the emergency room physician and myself, basically there was a   history of stenosis and also history of having had multiple surgeries on the   left leg.  She stated that she did fall and fractured that left leg about a   year ago and had a lot of surgeries.    When she was seen by the hospitalist service, the further history that came   out was that she has atrial fibrillation, has had a previous right   cerebrovascular accident with left-sided weakness and has had a DVT/PE; this   history was not given to us by the patient when I initially saw her and she   was brought up none of the details about previous stroke, the Xarelto therapy   or the left-sided weakness.  She had a DVT/PE in 2016.  She was on Xarelto,   apparently she has atrial fibrillation.  She had a right-sided CVA in 10/2018   with left-sided weakness.    PAST MEDICAL HISTORY:  Other than the DVT, PE, atrial fibrillation, right CVA,   insomnia, and depression, significant for no back surgery.  She has had   appendix, gallbladder and a partial hysterectomy.    PHYSICAL EXAMINATION:  NEUROLOGIC:  She is awake and conversant.  Her back was tender over the facet   joint.  She had a distal left foot weakness which rated as 2-3/5, more   proximally her strength was good.   Her arm strength was god.  There was some   numbness in the left foot.  The left leg is little bit swollen compared to the   right.    INVESTIGATIONS:  She had an MRI scan of the lumbar spine.  She has mild   stenosis at L4-L5 and associated spondylolisthesis.    IMPRESSION:  This 48-year-old woman who presents with weakness in the left leg   and she has had a previous right hemisphere stroke.  She has mild stenosis in   the back.  She also has a complicated history in terms of atrial   fibrillation, previous DVTs and previous strokes.  She also states that she   has had multiple surgeries in the left leg.  There were incisions on the   medial and lateral aspect of the left leg and these may be a source of pain as   well as weakness.    At this stage, I do not think she is a surgical candidate.  She is morbidly   obese with a BMI of greater than 50.  She also has significant comorbidities   and most likely left leg weakness residual from the stroke.  Her leg pain may   be unrelated to her back and may be related to a stroke and the previous   surgeries.    RECOMMENDATIONS:  1.  Management as per medicine.  2.  Narcotics as per medicine.  3.  Lumbar x-rays.  4.  We will hold on epidural as she has been fully anticoagulated.  5.  OT, PT and rehabilitation assessment.  6.  Gabapentin.    At this stage, we are not contemplating any surgery for her.  She needs to   establish with a pain physician and needs to lose weight.  We will follow.       ____________________________________     MD ANAM OSMAN / DONNIE    DD:  12/10/2018 19:49:18  DT:  12/10/2018 23:25:01    D#:  [id number]  Job#:  [id number]

## 2018-12-11 NOTE — H&P
Senior Admission Note    In summary: Fabi Najera is a 48 y.o. female with   past medical history of CHF, stroke with improving left side weakness, spinal stenosis was planned to do neurosurgical intervention in [place], atrial fibrillation diagnosed about four months ago, recurrent DVT and PE diagnosed 2 years ago, she recently switched from enoxaparin to xeralto, she presented to the ER with increasd weakness in both lower extremities and especially on the left side, for which she had a ground level fall, with left side back hit the border of the bathtub. found with spinal stenosis on the MRI, neurosurgery consulted by the ED. No rib fractures. Patient denies saddle anesthesia or bowel/urinary incontinence. Denies head trauma.      Pertinent physical exam findings:  Obesity, 3/5 motor strength in lower ext, weaker on the left, decrease sensation in both lower ext below the knee however there is some feeling but not as good as upper limbs.  Negative babinski signs, difficult to elicit refluxes.  Normal mental status    Pertinent studies:   MRI mild anterolisthesis of L4 on 5. mild central canal stenosis at L4-5.  Chest x ray/ No acute cardiac or pulmonary abnormalities are identified.    Assessment and plan in summary:  1. Mild spinal canal stenosis causing weakness and recurrent falls   - no alarm signs, no edema on the MRI to start steroid   - neurosurgery consulted by the ED, Dr. Ellis    - continue home medications/ pain/    - diet ordered/ consider hold diet and enoxaparin for surgery if necessary      2. hypothyroidism treated with home meds     3.Hx of PE/ recurrent DVT and afib   - started on weight based enoxaparin 120 mg bid for now   - repeat H and H every 8 hour to monitor hemoglobin following the trauma     4.afib    - home med diltiazem            For full plan, please see Intern note for details   Andrez Clark M.D.  PGY 2

## 2018-12-11 NOTE — FACE TO FACE
Face to Face Supporting Documentation - Home Health    The encounter with this patient was in whole or in part the primary reason for home health admission.    Date of encounter:   Patient:                    MRN:                       YOB: 2019  Fabi Najera  [id number]  1970     Home health to see patient for:  Physical Therapy evaluation and treatment    Skilled need for:  Recent Deterioration of Health Status Weakness  Lower extremity    Homebound status evidenced by:  Needs the assistance of another person in order to leave the home. Leaving home requires a considerable and taxing effort. There is a normal inability to leave the home.    Community Physician to provide follow up care: No primary care provider on file.     Optional Interventions? No      I certify the face to face encounter for this home health care referral meets the CMS requirements and the encounter/clinical assessment with the patient was, in whole, or in part, for the medical condition(s) listed above, which is the primary reason for home health care. Based on my clinical findings: the service(s) are medically necessary, support the need for home health care, and the homebound criteria are met.  I certify that this patient has had a face to face encounter by myself.  Ev Whitehead M.D. - NPI: [id number]

## 2018-12-11 NOTE — ASSESSMENT & PLAN NOTE
Takes xarelto.   - Lovenox 120mg BID  - Per pharmacy, will need outpatient follow-up in coagulation clinic for home xarelto.

## 2018-12-11 NOTE — PROGRESS NOTES
Neurosurgery Progress Note    Subjective:  Low back and left greater than right leg pain persists, /10  Denies incontinence  Left leg weakness is longstanding following the stroke, she reports she was wheelchair bound prior to arrival  Pain improved with medications  Many allergies  Multiple comorbidities    Exam:  VSS  LS  Strength is 5/5 in the right leg  Left leg shows 2/5 distally and 3+/5 proximally      BP  Min: 86/52  Max: 126/88  Pulse  Av.3  Min: 80  Max: 95  Resp  Av  Min: 16  Max: 16  Temp  Av.7 °C (98.1 °F)  Min: 36.7 °C (98 °F)  Max: 36.8 °C (98.3 °F)  SpO2  Av.9 %  Min: 91 %  Max: 96 %    No Data Recorded    Recent Labs      12/10/18   1200  12/10/18   1857  18   0104   WBC  8.5   --   7.8   RBC  4.66   --   4.32   HEMOGLOBIN  13.9  13.7  13.2   HEMATOCRIT  43.6  42.0  39.4   MCV  93.6   --   91.2   MCH  29.8   --   30.6   MCHC  31.9*   --   33.5*   RDW  44.3   --   42.9   PLATELETCT  279   --   241   MPV  9.6   --   9.7     Recent Labs      12/10/18   1200  18   0104   SODIUM  139  143   POTASSIUM  3.8  3.9   CHLORIDE  104  106   CO2  27  29   GLUCOSE  105*  108*   BUN  11  12   CREATININE  0.78  0.83   CALCIUM  9.7  9.4     Recent Labs      18   0104   INR  1.39*           Intake/Output       12/10/18 0700 - 18 0659 18 0700 - 18 0659      3545-2459 1060-1406 Total 6065-0768 6707-5524 Total       Intake    P.O.  --  -- --  500  -- 500    P.O. -- -- -- 500 -- 500    I.V.  --  -- --  500  -- 500    Volume (mL) (0.9 % NaCl with KCl 20 mEq infusion) -- -- -- 500 -- 500    Total Intake -- -- -- 1000 -- 1000       Output    Total Output -- -- -- -- -- --       Net I/O     -- -- -- 1000 -- 1000            Intake/Output Summary (Last 24 hours) at 18 0814  Last data filed at 18 0700   Gross per 24 hour   Intake             1000 ml   Output                0 ml   Net             1000 ml            • oxyCODONE immediate-release  20 mg Q8HRS PRN    • Pharmacy Consult Request  1 Each PRN   • MD ALERT...DO NOT ADMINISTER NSAIDS or ASPIRIN unless ORDERED By Neurosurgery  1 Each PRN   • diphenhydrAMINE  25 mg Q6HRS PRN    Or   • diphenhydrAMINE  25 mg Q6HRS PRN   • ondansetron  4 mg Q4HRS PRN   • ondansetron  4 mg Q4HRS PRN   • promethazine  12.5-25 mg Q4HRS PRN   • promethazine  12.5-25 mg Q4HRS PRN   • prochlorperazine  5-10 mg Q4HRS PRN   • LORazepam  0.5 mg Q6HRS PRN    Or   • LORazepam  0.5 mg Q6HRS PRN   • calcium carbonate  500 mg BID   • vitamin D  5,000 Units DAILY   • docusate sodium  100 mg BID   • senna-docusate  1 Tab Nightly   • senna-docusate  1 Tab Q24HRS PRN   • polyethylene glycol/lytes  1 Packet BID PRN   • magnesium hydroxide  30 mL QDAY PRN   • bisacodyl  10 mg Q24HRS PRN   • fleet  1 Each Once PRN   • 0.9 % NaCl with KCl 20 mEq 1,000 mL   Continuous   • enoxaparin (LOVENOX) injection  120 mg Q12HRS   • enalaprilat  1.25 mg Q6HRS PRN   • DILTIAZem  60 mg BID   • FLUoxetine  60 mg DAILY   • levothyroxine  175 mcg AM ES   • carisoprodol  350 mg Q8HRS PRN   • zolpidem  10 mg HS PRN       Assessment and Plan:  Hospital day #2  POD #n/a  Prophylactic anticoagulation: yes         Start date/time: 12/10/2018    Plan:  1. Analgesia  2. Rehab assessment  3. No surgical intervention planned  4. We can follow up as outpatient, she is a high risk for surgery, her weakness is likely due to her previous stroke, she has mild stenosis, we will try to avoid surgery

## 2018-12-11 NOTE — ASSESSMENT & PLAN NOTE
Presented after ground level fall. Back pain and leg weakness and neuropathy with findings of L4-L5 spinal stenosis on MRI. Neurosurgery consulted, but not planning on surgery given her weight and leg weakness more related to her prior CVA. Given her stenosis is mild may not warrant surgery. She can discharge with either outpatient or  PT and PCP follow-up for pain management, she would like to schedule with [facility] clinic.  - PT recommends home health, will try to arrange this  - Fall precautions  - Continue oxycodone 15mg q6h PRN  - Follow up with neurosurgery after discharge

## 2018-12-11 NOTE — THERAPY
"Physical Therapy Evaluation completed.   Bed Mobility:  Supine to Sit: Stand by Assist (HOB flat, no rail, grabbed sheet)  Transfers: Sit to Stand: Contact Guard Assist  Gait: Level Of Assist: Contact Guard Assist with Front-Wheel Walker       Plan of Care: Patient with no further skilled PT needs in the acute care setting at this time  Discharge Recommendations: Equipment: Patient has SPC, 4WW. Post-acute therapy: Discharge to home with outpatient or home health for additional skilled therapy services.    See \"Rehab Therapy-Acute\" Patient Summary Report for complete documentation.       Patient is a 47 YO female that was admitted with complaints of back pain and several recent falls. Imaging revealed spinal stenosis though no surgical intervention planned. Patient with PMHx significant for CHF, CVA, Afib, recurrent DVT and PE, and chronic LBP. Patient presented to PT with pain, impaired balance, weakness, decreased activity tolerance, and self limiting behavior. Patient ambulated approximately 40ft in unit with FWW with CGA. Patient appears to be at baseline mobility and is self limiting. Patient expressed feelings of embarrassment and shame regarding health and mobility, required encouragement throughout session. Provided education and handout regarding LE exercises. Currently recommend outpatient or home health PT to progress functional mobility and address pain and deficits. No further acute PT needs.  "

## 2018-12-11 NOTE — PROGRESS NOTES
Problem: Safety  Goal: Will remain free from falls    Intervention: Assess risk factors for falls  Pt calls appropriately before getting out of bed. Pt is a one assist with the walker. Gait steady. Fall risk band on, signage outside the door. The bed in lowest and locked position.       Problem: Venous Thromboembolism (VTW)/Deep Vein Thrombosis (DVT) Prevention:  Goal: Patient will participate in Venous Thrombosis (VTE)/Deep Vein Thrombosis (DVT)Prevention Measures    Intervention: Ensure patient wears graduated elastic stockings (COMPA hose) and/or SCDs, if ordered, when in bed or chair (Remove at least once per shift for skin check)  Pt educated on SCDs importance. The patient is agreeable to wearing SCDs overnight. Pt on Lovenox. Pt declines TEDs because they are too tight.

## 2018-12-11 NOTE — DISCHARGE PLANNING
Anticipated Discharge Disposition:   Home    Action:   Pt has no insurance so CM called [facility] to screen pt for Medicaid.     Met with pt. She notified CM that she has [employer] insurance.   Called Soraida at [facility] , they were not able to verify.   But Soraida will try. However, after trying several times , insurance is still unverifyable.  CM asked pt to bring in the insurance card.     Per RN, pt will need home health.   Pt chose whichever HH would take her insurance.   Choice faxed to [facility].    Pt does not have a PCP and she refused any information for [facility] as she tried to go to these clinics in the past and was unable to get in. And unless she has verifyable insurance then scheduling in unable to get an appointment for her.     Barriers to Discharge:   Medical clearance  PT recommendation    Plan:   Follow up with pt  Get a copy of her insurance card.     Addendum:  Pt wants a letter from MD stating that she needs a comfortable bed.   Pt said that her son can help her get assistance for a comfortable bed.

## 2018-12-12 ENCOUNTER — PATIENT OUTREACH (OUTPATIENT)
Dept: HEALTH INFORMATION MANAGEMENT | Facility: OTHER | Age: 48
End: 2018-12-12

## 2018-12-12 LAB
ANION GAP SERPL CALC-SCNC: 10 MMOL/L (ref 0–11.9)
BUN SERPL-MCNC: 12 MG/DL (ref 8–22)
CALCIUM SERPL-MCNC: 8.9 MG/DL (ref 8.5–10.5)
CHLORIDE SERPL-SCNC: 105 MMOL/L (ref 96–112)
CO2 SERPL-SCNC: 27 MMOL/L (ref 20–33)
CREAT SERPL-MCNC: 0.8 MG/DL (ref 0.5–1.4)
ERYTHROCYTE [DISTWIDTH] IN BLOOD BY AUTOMATED COUNT: 45 FL (ref 35.9–50)
GLUCOSE SERPL-MCNC: 151 MG/DL (ref 65–99)
HCT VFR BLD AUTO: 38.9 % (ref 37–47)
HGB BLD-MCNC: 12.5 G/DL (ref 12–16)
INR PPP: 1.11 (ref 0.87–1.13)
MCH RBC QN AUTO: 30.3 PG (ref 27–33)
MCHC RBC AUTO-ENTMCNC: 32.1 G/DL (ref 33.6–35)
MCV RBC AUTO: 94.2 FL (ref 81.4–97.8)
PLATELET # BLD AUTO: 249 K/UL (ref 164–446)
PMV BLD AUTO: 9.9 FL (ref 9–12.9)
POTASSIUM SERPL-SCNC: 4 MMOL/L (ref 3.6–5.5)
PROTHROMBIN TIME: 14.4 SEC (ref 12–14.6)
RBC # BLD AUTO: 4.13 M/UL (ref 4.2–5.4)
SODIUM SERPL-SCNC: 142 MMOL/L (ref 135–145)
WBC # BLD AUTO: 6.1 K/UL (ref 4.8–10.8)

## 2018-12-12 PROCEDURE — 700102 HCHG RX REV CODE 250 W/ 637 OVERRIDE(OP): Performed by: INTERNAL MEDICINE

## 2018-12-12 PROCEDURE — 700112 HCHG RX REV CODE 229: Performed by: NEUROLOGICAL SURGERY

## 2018-12-12 PROCEDURE — A9270 NON-COVERED ITEM OR SERVICE: HCPCS | Performed by: STUDENT IN AN ORGANIZED HEALTH CARE EDUCATION/TRAINING PROGRAM

## 2018-12-12 PROCEDURE — A9270 NON-COVERED ITEM OR SERVICE: HCPCS | Performed by: NEUROLOGICAL SURGERY

## 2018-12-12 PROCEDURE — 99231 SBSQ HOSP IP/OBS SF/LOW 25: CPT | Mod: GC | Performed by: INTERNAL MEDICINE

## 2018-12-12 PROCEDURE — 700111 HCHG RX REV CODE 636 W/ 250 OVERRIDE (IP): Performed by: STUDENT IN AN ORGANIZED HEALTH CARE EDUCATION/TRAINING PROGRAM

## 2018-12-12 PROCEDURE — 36415 COLL VENOUS BLD VENIPUNCTURE: CPT

## 2018-12-12 PROCEDURE — 770001 HCHG ROOM/CARE - MED/SURG/GYN PRIV*

## 2018-12-12 PROCEDURE — A9270 NON-COVERED ITEM OR SERVICE: HCPCS | Performed by: INTERNAL MEDICINE

## 2018-12-12 PROCEDURE — 85610 PROTHROMBIN TIME: CPT

## 2018-12-12 PROCEDURE — 700102 HCHG RX REV CODE 250 W/ 637 OVERRIDE(OP): Performed by: NEUROLOGICAL SURGERY

## 2018-12-12 PROCEDURE — 700102 HCHG RX REV CODE 250 W/ 637 OVERRIDE(OP): Performed by: STUDENT IN AN ORGANIZED HEALTH CARE EDUCATION/TRAINING PROGRAM

## 2018-12-12 PROCEDURE — 85027 COMPLETE CBC AUTOMATED: CPT

## 2018-12-12 PROCEDURE — 80048 BASIC METABOLIC PNL TOTAL CA: CPT

## 2018-12-12 RX ORDER — OXYCODONE HYDROCHLORIDE 5 MG/1
15 TABLET ORAL EVERY 6 HOURS PRN
Status: DISCONTINUED | OUTPATIENT
Start: 2018-12-12 | End: 2018-12-17 | Stop reason: HOSPADM

## 2018-12-12 RX ADMIN — ENOXAPARIN SODIUM 120 MG: 150 INJECTION SUBCUTANEOUS at 16:25

## 2018-12-12 RX ADMIN — DILTIAZEM HYDROCHLORIDE 60 MG: 60 TABLET, FILM COATED ORAL at 16:25

## 2018-12-12 RX ADMIN — DILTIAZEM HYDROCHLORIDE 60 MG: 60 TABLET, FILM COATED ORAL at 06:00

## 2018-12-12 RX ADMIN — ZOLPIDEM TARTRATE 10 MG: 5 TABLET ORAL at 20:23

## 2018-12-12 RX ADMIN — ANTACID TABLETS 500 MG: 500 TABLET, CHEWABLE ORAL at 04:45

## 2018-12-12 RX ADMIN — OXYCODONE HYDROCHLORIDE 20 MG: 10 TABLET ORAL at 01:55

## 2018-12-12 RX ADMIN — FLUOXETINE HYDROCHLORIDE 60 MG: 20 CAPSULE ORAL at 04:46

## 2018-12-12 RX ADMIN — LEVOTHYROXINE SODIUM 175 MCG: 150 TABLET ORAL at 04:45

## 2018-12-12 RX ADMIN — VITAMIN D, TAB 1000IU (100/BT) 5000 UNITS: 25 TAB at 04:47

## 2018-12-12 RX ADMIN — CARISOPRODOL 350 MG: 350 TABLET ORAL at 16:25

## 2018-12-12 RX ADMIN — ANTACID TABLETS 500 MG: 500 TABLET, CHEWABLE ORAL at 16:25

## 2018-12-12 RX ADMIN — POLYETHYLENE GLYCOL 3350 1 PACKET: 17 POWDER, FOR SOLUTION ORAL at 08:23

## 2018-12-12 RX ADMIN — DOCUSATE SODIUM 100 MG: 100 CAPSULE, LIQUID FILLED ORAL at 16:25

## 2018-12-12 RX ADMIN — CARISOPRODOL 350 MG: 350 TABLET ORAL at 08:11

## 2018-12-12 RX ADMIN — OXYCODONE HYDROCHLORIDE 15 MG: 5 TABLET ORAL at 18:13

## 2018-12-12 RX ADMIN — OXYCODONE HYDROCHLORIDE 20 MG: 10 TABLET ORAL at 10:18

## 2018-12-12 RX ADMIN — DOCUSATE SODIUM 100 MG: 100 CAPSULE, LIQUID FILLED ORAL at 06:00

## 2018-12-12 RX ADMIN — STANDARDIZED SENNA CONCENTRATE AND DOCUSATE SODIUM 1 TABLET: 8.6; 5 TABLET, FILM COATED ORAL at 20:27

## 2018-12-12 RX ADMIN — ENOXAPARIN SODIUM 120 MG: 150 INJECTION SUBCUTANEOUS at 04:48

## 2018-12-12 ASSESSMENT — PATIENT HEALTH QUESTIONNAIRE - PHQ9
1. LITTLE INTEREST OR PLEASURE IN DOING THINGS: NOT AT ALL
SUM OF ALL RESPONSES TO PHQ9 QUESTIONS 1 AND 2: 0
2. FEELING DOWN, DEPRESSED, IRRITABLE, OR HOPELESS: NOT AT ALL

## 2018-12-12 ASSESSMENT — PAIN SCALES - GENERAL
PAINLEVEL_OUTOF10: 6
PAINLEVEL_OUTOF10: 7
PAINLEVEL_OUTOF10: 9
PAINLEVEL_OUTOF10: 6
PAINLEVEL_OUTOF10: 8
PAINLEVEL_OUTOF10: 8
PAINLEVEL_OUTOF10: 7
PAINLEVEL_OUTOF10: 7
PAINLEVEL_OUTOF10: 5

## 2018-12-12 ASSESSMENT — ENCOUNTER SYMPTOMS
DEPRESSION: 0
SHORTNESS OF BREATH: 0
BRUISES/BLEEDS EASILY: 0
DIZZINESS: 0
CHILLS: 0
VOMITING: 0
FEVER: 0
NAUSEA: 0
BACK PAIN: 1
BLURRED VISION: 0
HEADACHES: 0
NERVOUS/ANXIOUS: 0
FALLS: 1
SORE THROAT: 0
COUGH: 0
DOUBLE VISION: 0

## 2018-12-12 NOTE — CARE PLAN
Problem: Safety  Goal: Will remain free from falls  Outcome: PROGRESSING AS EXPECTED  Treaded socks in place, bed alarm on, call light in reach, appropriate assistive devices used, pt calls appropriately.    Problem: Infection  Goal: Will remain free from infection  Outcome: PROGRESSING AS EXPECTED  VSS, afebrile, labs WDL. Proper hand hygiene performed during pt care. No s/s of infection.

## 2018-12-12 NOTE — PROGRESS NOTES
Internal Medicine Interval Note  Note Author: Andrez Clark M.D.     Name Fabi Najera       1970   Age/Sex 48 y.o. female   MRN [id number]   Code Status FULL     After 5PM or if no immediate response to page, please call for cross-coverage  Attending/Team: Phoebe/Adam See Patient List for primary contact information  Call [phone] to page    1st Call - Day Intern (R1):   [place] 2nd Call - Day Sr. Resident (R2/R3):   Eduardo         Reason for interval visit  (Principal Problem)   Progressive weakness    Interval Problem Daily Status Update  (24 hours, problem oriented, brief subjective history, new lab/imaging data pertinent to that problem)   - likely dc today  - Discussed with patient that neurosurgery does not want to do intervention right now given her weight. Will need home health physical therapy, follow outpatient neurosurgery and pain management after discharge.     Review of Systems   Constitutional: Negative for chills and fever.   HENT: Negative for congestion and sore throat.    Eyes: Negative for blurred vision and double vision.   Respiratory: Negative for cough and shortness of breath.    Cardiovascular: Positive for leg swelling. Negative for chest pain.   Gastrointestinal: Negative for nausea and vomiting.   Genitourinary: Negative for dysuria and urgency.   Musculoskeletal: Positive for back pain and falls.   Skin: Negative for itching and rash.   Neurological: Negative for dizziness and headaches.   Endo/Heme/Allergies: Negative for environmental allergies. Does not bruise/bleed easily.   Psychiatric/Behavioral: Negative for depression. The patient is not nervous/anxious.        Disposition/Barriers to discharge:   None    Consultants/Specialty  Neurosurgery    PCP: No primary care provider on file.    Quality Measures  Quality-Core Measures   Reviewed items::  Labs reviewed and Medications reviewed  DVT prophylaxis pharmacological::  Enoxaparin  (Lovenox)      Physical Exam     Vitals:    12/11/18 1755 12/11/18 2000 12/12/18 0400 12/12/18 0800   BP: 123/74 (!) 99/54 104/57 147/78   Pulse: 80 85 87 89   Resp:  16 16 16   Temp:  36.5 °C (97.7 °F) 36.2 °C (97.1 °F) 36.9 °C (98.4 °F)   TempSrc:  Temporal Temporal Temporal   SpO2:  96% 95% 90%   Weight:       Height:         Body mass index is 52.09 kg/m².   Oxygen Therapy:  Pulse Oximetry: 90 %, O2 (LPM): 2, O2 Delivery: Nasal Cannula    Physical Exam   Constitutional: She is oriented to person, place, and time and well-developed, well-nourished, and in no distress.   Morbidly obese   HENT:   Head: Normocephalic and atraumatic.   Mouth/Throat: Oropharynx is clear and moist.   Eyes: Pupils are equal, round, and reactive to light. Conjunctivae and EOM are normal.   Neck: Normal range of motion. Neck supple.   Cardiovascular: Normal rate, regular rhythm, normal heart sounds and intact distal pulses.    Pulmonary/Chest: Effort normal and breath sounds normal.   Abdominal: Soft. Bowel sounds are normal.   Musculoskeletal: Normal range of motion.   Neurological: She is alert and oriented to person, place, and time.   3/5 strength in LLE, 4/5 strength in RLE. Numbness up to upper thigh L > R   Skin: Skin is warm and dry.   Psychiatric: Mood, memory, affect and judgment normal.     Assessment/Plan     Leg weakness   Assessment & Plan     Fabi Najera is a 48 y.o. Female with hypothryoidism, stroke with improving left side weakness, spinal stenosis, atrial fibrillation diagnosed about four months ago, recurrent DVT and PE diagnosed 2 years ago presented to the ER with ground level fall due to increased weakness in both lower extremities (legs give up), reported her left leg pain recovered and this more of symmetrical weakness and pain. The pain seems the main issue during the hospitalization which required us to increase her home dose of oxycodone, found with mild spinal stenosis on MRI at level L4-L5, NO RED  FLAG SYMPTOMS OR SIGNS. Neurosurgery recommended NO surgical intervention during hospitalization as leg weakness more related to her prior CVA and advised just to optimize her pain control. PT evaluated the patient and recommended home with home PT, no inpatient needs. The patient is stable for discharge, with one week supply of pain medication, do outpatient physical therapy, following new PCP as she is recently moved from [place], her PCP will stop sending her pain medication due to relocation to [place]. Will try to schedule with [facility] clinic and might need pain management to control pain, educated about weight loss is important in her condition and can make a large difference in her pain level.       Plan:    - PT recommends home health  - Fall precautions  - Continue home oxycodone 20mg q8h PRN  - Follow up with neurosurgery after discharge      Depression   Assessment & Plan     Continue home fluoxetine 60mg      Insomnia   Assessment & Plan     Continue home zolpidem 10mg      Hypothyroidism   Assessment & Plan     TSH showed elevated TSH suggest possible running out of medication, Continue home levothyroxine 175mcg      History of DVT (deep vein thrombosis)   Assessment & Plan     Takes xarelto.   - Lovenox 120mg BID      AF (atrial fibrillation) ([facility])   Assessment & Plan     Continue home diltiazem 60mg BID

## 2018-12-12 NOTE — PROGRESS NOTES
Received report from night shift RN. Assumed care of patient. Pt assessed and stable. VSS. Patient reports 7/10 pain at this time.  Administered medication for pain.  Discussed plan of care for day with patient and received verbal understanding. Call light within reach, strip alarm active, bed in low position.      Will notify [facility] of patient request for home O2.

## 2018-12-12 NOTE — PROGRESS NOTES
Received report from day PETER Desouza. Assumed care at 1900. Pt A&Ox4. Reporting no pain, no SOB or signs of discomfort. Discussed plan of care with pt. Pt resting comfortably, treaded socks in place, bed locked and in lowest position, call light in reach.

## 2018-12-12 NOTE — THERAPY
"Occupational Therapy Evaluation completed.   Functional Status:  Pt pleasant reports she has back pain & LLE weakness.  Pt was able to sit on EOB from supine with SBA.  Pt donned socks with SBA seated EOB.  Pt able to amb to bathroom with FWW & CGA.  Pt id well with encouragement.  Pt reprots some left sided rib pain from her previous fall.  Pt states she lives with her daughter who can help upon D/C.  Plan of Care: Patient with no further skilled OT needs in the acute care setting at this time  Discharge Recommendations:  Equipment: Shower Chair. Post-acute therapy Discharge to home with outpatient or home health for additional skilled therapy services.    Pt appears to have some long standing back & LLE pain.  Pt would benefit from out/pt PT to address these impairments.     See \"Rehab Therapy-Acute\" Patient Summary Report for complete documentation.    "

## 2018-12-12 NOTE — PROGRESS NOTES
Neurosurgery Progress Note    Subjective:  Low back and left greater than right leg pain persists, improved with medications  Denies incontinence  Left leg weakness is longstanding following the stroke, she reports she was wheelchair bound prior to arrival, ambulated with FWW yesterday  Many allergies  Multiple comorbidities    Exam:  VSS  LS  Strength is 5/5 in the right leg  Left leg shows 2/5 distally and 3+/5 proximally      BP  Min: 99/54  Max: 147/78  Pulse  Av.8  Min: 78  Max: 89  Resp  Av  Min: 16  Max: 16  Temp  Av.6 °C (97.9 °F)  Min: 36.2 °C (97.1 °F)  Max: 36.9 °C (98.4 °F)  SpO2  Av.7 %  Min: 90 %  Max: 96 %    No Data Recorded    Recent Labs      12/10/18   1200   18   0104  18   0849  18   0345   WBC  8.5   --   7.8   --   6.1   RBC  4.66   --   4.32   --   4.13*   HEMOGLOBIN  13.9   < >  13.2  12.8  12.5   HEMATOCRIT  43.6   < >  39.4  39.3  38.9   MCV  93.6   --   91.2   --   94.2   MCH  29.8   --   30.6   --   30.3   MCHC  31.9*   --   33.5*   --   32.1*   RDW  44.3   --   42.9   --   45.0   PLATELETCT  279   --   241   --   249   MPV  9.6   --   9.7   --   9.9    < > = values in this interval not displayed.     Recent Labs      12/10/18   1200  18   0104  18   0346   SODIUM  139  143  142   POTASSIUM  3.8  3.9  4.0   CHLORIDE  104  106  105   CO2  27  29  27   GLUCOSE  105*  108*  151*   BUN  11  12  12   CREATININE  0.78  0.83  0.80   CALCIUM  9.7  9.4  8.9     Recent Labs      18   0104  18   0346   INR  1.39*  1.11           Intake/Output       18 0700 - 18 0659 18 0700 - 18 0659      [id number] [id number] Total [id number] 2912-7440 Total       Intake    P.O.  2800  -- 2800  --  -- --    P.O. 2800 -- 2800 -- -- --    I.V.  500  -- 500  --  -- --    Volume (mL) (0.9 % NaCl with KCl 20 mEq infusion) 500 -- 500 -- -- --    Total Intake 3300 -- 3300 -- -- --       Output    Urine  1000  -- 1000  --  -- --    Number  of Times Voided 2 x 1 x 3 x 1 x -- 1 x    Urine Void (mL) 1000 -- 1000 -- -- --    Stool  --  -- --  --  -- --    Number of Times Stooled -- -- -- 0 x -- 0 x    Total Output 1000 -- 1000 -- -- --       Net I/O     2300 -- 2300 -- -- --            Intake/Output Summary (Last 24 hours) at 12/12/18 0917  Last data filed at 12/11/18 1800   Gross per 24 hour   Intake             1900 ml   Output              800 ml   Net             1100 ml            • oxyCODONE immediate-release  20 mg Q8HRS PRN   • Pharmacy Consult Request  1 Each PRN   • MD ALERT...DO NOT ADMINISTER NSAIDS or ASPIRIN unless ORDERED By Neurosurgery  1 Each PRN   • diphenhydrAMINE  25 mg Q6HRS PRN    Or   • diphenhydrAMINE  25 mg Q6HRS PRN   • ondansetron  4 mg Q4HRS PRN   • ondansetron  4 mg Q4HRS PRN   • promethazine  12.5-25 mg Q4HRS PRN   • promethazine  12.5-25 mg Q4HRS PRN   • prochlorperazine  5-10 mg Q4HRS PRN   • calcium carbonate  500 mg BID   • vitamin D  5,000 Units DAILY   • docusate sodium  100 mg BID   • senna-docusate  1 Tab Nightly   • senna-docusate  1 Tab Q24HRS PRN   • polyethylene glycol/lytes  1 Packet BID PRN   • magnesium hydroxide  30 mL QDAY PRN   • bisacodyl  10 mg Q24HRS PRN   • fleet  1 Each Once PRN   • enoxaparin (LOVENOX) injection  120 mg Q12HRS   • enalaprilat  1.25 mg Q6HRS PRN   • DILTIAZem  60 mg BID   • FLUoxetine  60 mg DAILY   • levothyroxine  175 mcg AM ES   • carisoprodol  350 mg Q8HRS PRN   • zolpidem  10 mg HS PRN       Assessment and Plan:  Hospital day #3  POD #n/a  Prophylactic anticoagulation: yes         Start date/time: 12/10/2018    Plan:  1. Analgesia  2. No surgical intervention planned  3. We can follow up as outpatient, she is a high risk for surgery, her weakness is likely due to her previous stroke, she has mild stenosis, we will try to avoid surgery and likely recommend pain management  4. Neurosurgery to sign off, please call with any questions.

## 2018-12-13 ENCOUNTER — PATIENT OUTREACH (OUTPATIENT)
Dept: HEALTH INFORMATION MANAGEMENT | Facility: OTHER | Age: 48
End: 2018-12-13

## 2018-12-13 PROBLEM — R09.02 HYPOXIA: Status: ACTIVE | Noted: 2018-12-13

## 2018-12-13 PROBLEM — G47.33 OBSTRUCTIVE SLEEP APNEA SYNDROME: Status: ACTIVE | Noted: 2018-12-13

## 2018-12-13 LAB — UFH PPP CHRO-ACNC: 1.5 U/ML

## 2018-12-13 PROCEDURE — 770001 HCHG ROOM/CARE - MED/SURG/GYN PRIV*

## 2018-12-13 PROCEDURE — A9270 NON-COVERED ITEM OR SERVICE: HCPCS | Performed by: NEUROLOGICAL SURGERY

## 2018-12-13 PROCEDURE — 700111 HCHG RX REV CODE 636 W/ 250 OVERRIDE (IP): Performed by: NEUROLOGICAL SURGERY

## 2018-12-13 PROCEDURE — 700102 HCHG RX REV CODE 250 W/ 637 OVERRIDE(OP): Performed by: STUDENT IN AN ORGANIZED HEALTH CARE EDUCATION/TRAINING PROGRAM

## 2018-12-13 PROCEDURE — 36415 COLL VENOUS BLD VENIPUNCTURE: CPT

## 2018-12-13 PROCEDURE — A9270 NON-COVERED ITEM OR SERVICE: HCPCS | Performed by: STUDENT IN AN ORGANIZED HEALTH CARE EDUCATION/TRAINING PROGRAM

## 2018-12-13 PROCEDURE — 700102 HCHG RX REV CODE 250 W/ 637 OVERRIDE(OP): Performed by: INTERNAL MEDICINE

## 2018-12-13 PROCEDURE — 700112 HCHG RX REV CODE 229: Performed by: NEUROLOGICAL SURGERY

## 2018-12-13 PROCEDURE — 700102 HCHG RX REV CODE 250 W/ 637 OVERRIDE(OP): Performed by: NEUROLOGICAL SURGERY

## 2018-12-13 PROCEDURE — 700111 HCHG RX REV CODE 636 W/ 250 OVERRIDE (IP): Performed by: STUDENT IN AN ORGANIZED HEALTH CARE EDUCATION/TRAINING PROGRAM

## 2018-12-13 PROCEDURE — 85520 HEPARIN ASSAY: CPT

## 2018-12-13 PROCEDURE — 99231 SBSQ HOSP IP/OBS SF/LOW 25: CPT | Mod: GC | Performed by: INTERNAL MEDICINE

## 2018-12-13 PROCEDURE — A9270 NON-COVERED ITEM OR SERVICE: HCPCS | Performed by: INTERNAL MEDICINE

## 2018-12-13 RX ADMIN — OXYCODONE HYDROCHLORIDE 15 MG: 5 TABLET ORAL at 00:16

## 2018-12-13 RX ADMIN — CARISOPRODOL 350 MG: 350 TABLET ORAL at 00:16

## 2018-12-13 RX ADMIN — DOCUSATE SODIUM 100 MG: 100 CAPSULE, LIQUID FILLED ORAL at 17:30

## 2018-12-13 RX ADMIN — ONDANSETRON 4 MG: 4 TABLET, ORALLY DISINTEGRATING ORAL at 14:10

## 2018-12-13 RX ADMIN — FLUOXETINE HYDROCHLORIDE 60 MG: 20 CAPSULE ORAL at 06:14

## 2018-12-13 RX ADMIN — LEVOTHYROXINE SODIUM 175 MCG: 150 TABLET ORAL at 06:14

## 2018-12-13 RX ADMIN — ENOXAPARIN SODIUM 120 MG: 150 INJECTION SUBCUTANEOUS at 17:33

## 2018-12-13 RX ADMIN — DOCUSATE SODIUM 100 MG: 100 CAPSULE, LIQUID FILLED ORAL at 06:14

## 2018-12-13 RX ADMIN — OXYCODONE HYDROCHLORIDE 15 MG: 5 TABLET ORAL at 20:04

## 2018-12-13 RX ADMIN — OXYCODONE HYDROCHLORIDE 15 MG: 5 TABLET ORAL at 06:14

## 2018-12-13 RX ADMIN — ENOXAPARIN SODIUM 120 MG: 150 INJECTION SUBCUTANEOUS at 06:15

## 2018-12-13 RX ADMIN — CARISOPRODOL 350 MG: 350 TABLET ORAL at 17:30

## 2018-12-13 RX ADMIN — STANDARDIZED SENNA CONCENTRATE AND DOCUSATE SODIUM 1 TABLET: 8.6; 5 TABLET, FILM COATED ORAL at 21:48

## 2018-12-13 RX ADMIN — OXYCODONE HYDROCHLORIDE 15 MG: 5 TABLET ORAL at 14:05

## 2018-12-13 RX ADMIN — DILTIAZEM HYDROCHLORIDE 60 MG: 60 TABLET, FILM COATED ORAL at 06:15

## 2018-12-13 RX ADMIN — ZOLPIDEM TARTRATE 10 MG: 5 TABLET ORAL at 20:04

## 2018-12-13 RX ADMIN — ANTACID TABLETS 500 MG: 500 TABLET, CHEWABLE ORAL at 06:14

## 2018-12-13 RX ADMIN — ANTACID TABLETS 500 MG: 500 TABLET, CHEWABLE ORAL at 17:30

## 2018-12-13 RX ADMIN — VITAMIN D, TAB 1000IU (100/BT) 5000 UNITS: 25 TAB at 06:14

## 2018-12-13 RX ADMIN — DILTIAZEM HYDROCHLORIDE 60 MG: 60 TABLET, FILM COATED ORAL at 17:30

## 2018-12-13 ASSESSMENT — ENCOUNTER SYMPTOMS
BACK PAIN: 1
FALLS: 1
BRUISES/BLEEDS EASILY: 0
DEPRESSION: 0
NERVOUS/ANXIOUS: 0
VOMITING: 0
SHORTNESS OF BREATH: 0
DIZZINESS: 0
DOUBLE VISION: 0
COUGH: 0
SORE THROAT: 0
NAUSEA: 0
FEVER: 0
CHILLS: 0
BLURRED VISION: 0
HEADACHES: 0

## 2018-12-13 ASSESSMENT — PAIN SCALES - GENERAL
PAINLEVEL_OUTOF10: 6
PAINLEVEL_OUTOF10: 7
PAINLEVEL_OUTOF10: 8
PAINLEVEL_OUTOF10: 6
PAINLEVEL_OUTOF10: 6

## 2018-12-13 NOTE — PROGRESS NOTES
I was unaware that the nocturnal O2 study had been done since no one had contacted me, but there was a note stating it had been done. I called respiratory and asked for clarification. They told me that there was only one machine for long-period overnight oximetry and it was being used on another patient, but Norman did a shorter study that determined her O2 sats dropped to 85% while sleeping, as mentioned in his note.

## 2018-12-13 NOTE — PROGRESS NOTES
Received report from day RN Elizabet. Assumed care at 1900. Pt A&Ox4. Reporting 6/10 back pain, pain medication to be given when available. No SOB or signs of discomfort. Discussed plan of care with pt. Pt resting comfortably, treaded socks in place, bed locked and in lowest position, call light in reach.

## 2018-12-13 NOTE — CARE PLAN
Problem: Safety  Goal: Will remain free from injury  Outcome: PROGRESSING AS EXPECTED  Patient remains free from falls.     Problem: Pain Management  Goal: Pain level will decrease to patient's comfort goal  Outcome: PROGRESSING AS EXPECTED  Medicated for pain as needed per MAR

## 2018-12-13 NOTE — FACE TO FACE
Face to Face Note  -  Durable Medical Equipment    Ev Whitehead M.D. - NPI: [id number]  I certify that this patient is under my care and that they have had a durable medical equipment(DME)face to face encounter by myself that meets the physician DME face-to-face encounter requirements with this patient on:    Date of encounter:   Patient:                    MRN:                       YOB: 2019  Fabi Najera  [id number]  1970     The encounter with the patient was in whole, or in part, for the following medical condition, which is the primary reason for durable medical equipment:  Other - Obesity Hypoventilation    I certify that, based on my findings, the following durable medical equipment is medically necessary:  Oxygen.    HOME O2 Saturation Measurements:(Values must be present for Home Oxygen orders)  Nocturnal: Desats 85% on RA, requires 2L NC to keep sats > 90%       My Clinical findings support the need for the above equipment due to:  Hypoxia    Supporting Symptoms: Desats 85% on RA, requires 2L NC to keep sats > 90%

## 2018-12-13 NOTE — DISCHARGE PLANNING
Anticipated Discharge Disposition:   Home with HHC    Home with oxygen (concentrator and portable tank) Nocturnal  Uncertain if she will need oxygen for walking    Home with appointed PCP    Action:    -Patient telephoned [employer] member services and she does not have coverage as her former  did not complete the process.    -PFA contacted and patient is on their list to be screened for Medicaid.    -Spoke with Dr. Whitehead in person to ask about oxygen needs with walking. Telephoned Dr. Whitehead regarding oxygen needs when walking.  She stated she will order 6 minute walk test.    -Choice forms obtained for DME oxygen and HHC and faxed to [phone].  Patient states she used to have a CPAP but [place].    -Email sent to manager of  for possible LOAs for HHC and Oxygen    Barriers to Discharge:    No health care coverage    Plan:    Wait for results of 6 minute walk test for oxygen   Wait for screening results of PFA screening  Obtain home DME and

## 2018-12-13 NOTE — PROGRESS NOTES
Internal Medicine Interval Note  Note Author: Ev Whitehead M.D.     Name Fabi Najera       1970   Age/Sex 48 y.o. female   MRN [id number]   Code Status FULL     After 5PM or if no immediate response to page, please call for cross-coverage  Attending/Team: Phoebe/Adam See Patient List for primary contact information  Call [phone] to page    1st Call - Day Intern (R1):   [place] 2nd Call - Day Sr. Resident (R2/R3):   Eduardo         Reason for interval visit  (Principal Problem)   Progressive weakness      Interval Problem Daily Status Update  (24 hours, problem oriented, brief subjective history, new lab/imaging data pertinent to that problem)   - Nocturnal oximetry indicates that she requires O2 at night, will need home O2 before discharge as she has MAMTA but does not currently have CPAP  - If home O2 can be arranged, she can go home. HH PT also pending, but if this does not get approved she can still go home with outpatient PT orders  - Needs to establish PCP, patient given information about scheduling with [facility]     Review of Systems   Constitutional: Negative for chills and fever.   HENT: Negative for congestion and sore throat.    Eyes: Negative for blurred vision and double vision.   Respiratory: Negative for cough and shortness of breath.    Cardiovascular: Positive for leg swelling. Negative for chest pain.   Gastrointestinal: Negative for nausea and vomiting.   Genitourinary: Negative for dysuria and urgency.   Musculoskeletal: Positive for back pain and falls.   Skin: Negative for itching and rash.   Neurological: Negative for dizziness and headaches.   Endo/Heme/Allergies: Negative for environmental allergies. Does not bruise/bleed easily.   Psychiatric/Behavioral: Negative for depression. The patient is not nervous/anxious.        Disposition/Barriers to discharge:   None    Consultants/Specialty  Neurosurgery    PCP: No primary care provider on  file.      Quality Measures  Quality-Core Measures   Reviewed items::  Labs reviewed and Medications reviewed  DVT prophylaxis pharmacological::  Enoxaparin (Lovenox)          Physical Exam       Vitals:    12/12/18 1600 12/12/18 2000 12/13/18 0400 12/13/18 0715   BP: 136/70 133/72 127/88 (!) 96/66   Pulse: 91 73 82 85   Resp: 18 19 18 17   Temp: 36.8 °C (98.3 °F) 36.4 °C (97.5 °F) 36.4 °C (97.5 °F) 36.5 °C (97.7 °F)   TempSrc:  Temporal Temporal Temporal   SpO2: 90% 90% 92% 100%   Weight:       Height:         Body mass index is 52.09 kg/m².   Oxygen Therapy:  Pulse Oximetry: 100 %, O2 (LPM): 2    Physical Exam   Constitutional: She is oriented to person, place, and time and well-developed, well-nourished, and in no distress.   Morbidly obese   HENT:   Head: Normocephalic and atraumatic.   Mouth/Throat: Oropharynx is clear and moist.   Eyes: Pupils are equal, round, and reactive to light. Conjunctivae and EOM are normal.   Neck: Normal range of motion. Neck supple.   Cardiovascular: Normal rate, regular rhythm, normal heart sounds and intact distal pulses.    Pulmonary/Chest: Effort normal and breath sounds normal.   Abdominal: Soft. Bowel sounds are normal.   Musculoskeletal: Normal range of motion.   Neurological: She is alert and oriented to person, place, and time.   3/5 strength in LLE, 4/5 strength in RLE. Numbness up to upper thigh L > R   Skin: Skin is warm and dry.   Psychiatric: Mood, memory, affect and judgment normal.             Assessment/Plan     Leg weakness   Assessment & Plan    Presented after ground level fall. Back pain and leg weakness and neuropathy with findings of L4-L5 spinal stenosis on MRI. Neurosurgery consulted, but not planning on surgery given her weight and leg weakness more related to her prior CVA. Given her stenosis is mild may not warrant surgery. She can discharge with either outpatient or  PT and PCP follow-up for pain management (she will need to schedule with [facility] for primary care).  - PT recommends home health, will try to arrange this  - Fall precautions  - Continue oxycodone 15mg q6h PRN  - Follow up with neurosurgery after discharge     Depression   Assessment & Plan    Continue home fluoxetine 60mg     Insomnia   Assessment & Plan    Continue home zolpidem 10mg     Hypothyroidism   Assessment & Plan    Continue home levothyroxine 175mcg     History of DVT (deep vein thrombosis)   Assessment & Plan    Takes xarelto.   - Lovenox 120mg BID     AF (atrial fibrillation) ([facility])   Assessment & Plan    Continue home diltiazem 60mg BID

## 2018-12-13 NOTE — PROGRESS NOTES
Paged [facility] for orders regarding pain medication. Patient c/o pain that is not resolved with current meds. Patient asking for different pain regimen. Orders received.

## 2018-12-13 NOTE — PROGRESS NOTES
There were no orders for nocturnal O2 study needed for pt discharge. I paged [facility] and they put in orders for nocturnal O2 study.

## 2018-12-13 NOTE — FLOWSHEET NOTE
Nocturnal Oximetry    Setup Time: 2130    O2 (LPM): 0 (12/12/18 2000)    RN Notified: y     Study End Time: 2140  Pt sats dropped to 85%    Pt wears CPAP at home. With no O2

## 2018-12-13 NOTE — PROGRESS NOTES
Patient is alert and oriented. Up independently in the room, standby assist with walker in the hallways. Home O2 eval performed today. Medicated for pain as needed per MAR. Safety precautions remain in place, call bell within reach, hourly rounding performed, nonskid footwear in use.

## 2018-12-14 ENCOUNTER — APPOINTMENT (OUTPATIENT)
Dept: RADIOLOGY | Facility: MEDICAL CENTER | Age: 48
DRG: 552 | End: 2018-12-14
Attending: STUDENT IN AN ORGANIZED HEALTH CARE EDUCATION/TRAINING PROGRAM

## 2018-12-14 ENCOUNTER — TELEPHONE (OUTPATIENT)
Dept: HEALTH INFORMATION MANAGEMENT | Facility: OTHER | Age: 48
End: 2018-12-14

## 2018-12-14 PROCEDURE — 71045 X-RAY EXAM CHEST 1 VIEW: CPT

## 2018-12-14 PROCEDURE — A9270 NON-COVERED ITEM OR SERVICE: HCPCS | Performed by: NEUROLOGICAL SURGERY

## 2018-12-14 PROCEDURE — A9270 NON-COVERED ITEM OR SERVICE: HCPCS | Performed by: STUDENT IN AN ORGANIZED HEALTH CARE EDUCATION/TRAINING PROGRAM

## 2018-12-14 PROCEDURE — 700102 HCHG RX REV CODE 250 W/ 637 OVERRIDE(OP): Performed by: INTERNAL MEDICINE

## 2018-12-14 PROCEDURE — 700111 HCHG RX REV CODE 636 W/ 250 OVERRIDE (IP): Performed by: STUDENT IN AN ORGANIZED HEALTH CARE EDUCATION/TRAINING PROGRAM

## 2018-12-14 PROCEDURE — A9270 NON-COVERED ITEM OR SERVICE: HCPCS | Performed by: INTERNAL MEDICINE

## 2018-12-14 PROCEDURE — 700111 HCHG RX REV CODE 636 W/ 250 OVERRIDE (IP): Performed by: NEUROLOGICAL SURGERY

## 2018-12-14 PROCEDURE — 700102 HCHG RX REV CODE 250 W/ 637 OVERRIDE(OP): Performed by: STUDENT IN AN ORGANIZED HEALTH CARE EDUCATION/TRAINING PROGRAM

## 2018-12-14 PROCEDURE — 770001 HCHG ROOM/CARE - MED/SURG/GYN PRIV*

## 2018-12-14 PROCEDURE — 700112 HCHG RX REV CODE 229: Performed by: NEUROLOGICAL SURGERY

## 2018-12-14 PROCEDURE — 99231 SBSQ HOSP IP/OBS SF/LOW 25: CPT | Mod: GC | Performed by: INTERNAL MEDICINE

## 2018-12-14 PROCEDURE — 700102 HCHG RX REV CODE 250 W/ 637 OVERRIDE(OP): Performed by: NEUROLOGICAL SURGERY

## 2018-12-14 RX ADMIN — OXYCODONE HYDROCHLORIDE 15 MG: 5 TABLET ORAL at 15:46

## 2018-12-14 RX ADMIN — ENOXAPARIN SODIUM 120 MG: 150 INJECTION SUBCUTANEOUS at 17:22

## 2018-12-14 RX ADMIN — OXYCODONE HYDROCHLORIDE 15 MG: 5 TABLET ORAL at 21:54

## 2018-12-14 RX ADMIN — OXYCODONE HYDROCHLORIDE 15 MG: 5 TABLET ORAL at 08:49

## 2018-12-14 RX ADMIN — ANTACID TABLETS 500 MG: 500 TABLET, CHEWABLE ORAL at 17:19

## 2018-12-14 RX ADMIN — ENOXAPARIN SODIUM 120 MG: 150 INJECTION SUBCUTANEOUS at 05:18

## 2018-12-14 RX ADMIN — DOCUSATE SODIUM 100 MG: 100 CAPSULE, LIQUID FILLED ORAL at 17:19

## 2018-12-14 RX ADMIN — LEVOTHYROXINE SODIUM 175 MCG: 150 TABLET ORAL at 05:19

## 2018-12-14 RX ADMIN — CARISOPRODOL 350 MG: 350 TABLET ORAL at 01:46

## 2018-12-14 RX ADMIN — DILTIAZEM HYDROCHLORIDE 60 MG: 60 TABLET, FILM COATED ORAL at 17:22

## 2018-12-14 RX ADMIN — ZOLPIDEM TARTRATE 10 MG: 5 TABLET ORAL at 21:22

## 2018-12-14 RX ADMIN — CARISOPRODOL 350 MG: 350 TABLET ORAL at 13:35

## 2018-12-14 RX ADMIN — ANTACID TABLETS 500 MG: 500 TABLET, CHEWABLE ORAL at 05:19

## 2018-12-14 RX ADMIN — ONDANSETRON 4 MG: 4 TABLET, ORALLY DISINTEGRATING ORAL at 20:20

## 2018-12-14 RX ADMIN — VITAMIN D, TAB 1000IU (100/BT) 5000 UNITS: 25 TAB at 05:19

## 2018-12-14 RX ADMIN — CARISOPRODOL 350 MG: 350 TABLET ORAL at 23:31

## 2018-12-14 RX ADMIN — STANDARDIZED SENNA CONCENTRATE AND DOCUSATE SODIUM 1 TABLET: 8.6; 5 TABLET, FILM COATED ORAL at 17:19

## 2018-12-14 RX ADMIN — DOCUSATE SODIUM 100 MG: 100 CAPSULE, LIQUID FILLED ORAL at 05:19

## 2018-12-14 RX ADMIN — DILTIAZEM HYDROCHLORIDE 60 MG: 60 TABLET, FILM COATED ORAL at 05:19

## 2018-12-14 RX ADMIN — OXYCODONE HYDROCHLORIDE 15 MG: 5 TABLET ORAL at 02:05

## 2018-12-14 RX ADMIN — FLUOXETINE HYDROCHLORIDE 60 MG: 20 CAPSULE ORAL at 05:19

## 2018-12-14 ASSESSMENT — ENCOUNTER SYMPTOMS
BLURRED VISION: 0
FEVER: 0
BRUISES/BLEEDS EASILY: 0
DOUBLE VISION: 0
DIZZINESS: 0
FALLS: 1
CHILLS: 0
HEADACHES: 0
NERVOUS/ANXIOUS: 0
SORE THROAT: 0
BACK PAIN: 1
DEPRESSION: 0
VOMITING: 0
NAUSEA: 0
SHORTNESS OF BREATH: 0
COUGH: 0

## 2018-12-14 ASSESSMENT — PAIN SCALES - GENERAL
PAINLEVEL_OUTOF10: 6
PAINLEVEL_OUTOF10: 8
PAINLEVEL_OUTOF10: 6
PAINLEVEL_OUTOF10: 8
PAINLEVEL_OUTOF10: 6

## 2018-12-14 NOTE — PROGRESS NOTES
Internal Medicine Interval Note  Note Author: Ev Whitehead M.D.     Name Fabi Najera       1970   Age/Sex 48 y.o. female   MRN [id number]   Code Status FULL     After 5PM or if no immediate response to page, please call for cross-coverage  Attending/Team: Phoebe/Adam See Patient List for primary contact information  Call [phone] to page    1st Call - Day Intern (R1):   [place] 2nd Call - Day Sr. Resident (R2/R3):   Eduardo         Reason for interval visit  (Principal Problem)   Progressive weakness      Interval Problem Daily Status Update  (24 hours, problem oriented, brief subjective history, new lab/imaging data pertinent to that problem)   - Fell last night while walking to the bathroom. Pain in her left ribs, CXR showed no fracture. Pain this morning consistent with her prior bruise from her initial fall prior to presentation.  - Can discharge with HH PT and home O2     Review of Systems   Constitutional: Negative for chills and fever.   HENT: Negative for congestion and sore throat.    Eyes: Negative for blurred vision and double vision.   Respiratory: Negative for cough and shortness of breath.    Cardiovascular: Positive for leg swelling. Negative for chest pain.   Gastrointestinal: Negative for nausea and vomiting.   Genitourinary: Negative for dysuria and urgency.   Musculoskeletal: Positive for back pain and falls.   Skin: Negative for itching and rash.   Neurological: Negative for dizziness and headaches.   Endo/Heme/Allergies: Negative for environmental allergies. Does not bruise/bleed easily.   Psychiatric/Behavioral: Negative for depression. The patient is not nervous/anxious.        Disposition/Barriers to discharge:   None    Consultants/Specialty  Neurosurgery    PCP: No primary care provider on file.      Quality Measures  Quality-Core Measures   Reviewed items::  Labs reviewed and Medications reviewed  DVT prophylaxis pharmacological::  Enoxaparin  (Lovenox)          Physical Exam       Vitals:    12/13/18 2000 12/14/18 0330 12/14/18 0800 12/14/18 0937   BP: 120/72 115/66 106/80    Pulse: 84 82 79    Resp: 16 16 16    Temp: 36.4 °C (97.5 °F) 36.1 °C (96.9 °F) 36.2 °C (97.2 °F) 37.8 °C (100.1 °F)   TempSrc: Temporal Temporal Temporal Temporal   SpO2: 92% 94%     Weight:       Height:         Body mass index is 52.09 kg/m².   Oxygen Therapy:  Pulse Oximetry: 94 %, O2 (LPM): 0, O2 Delivery: None (Room Air)    Physical Exam   Constitutional: She is oriented to person, place, and time and well-developed, well-nourished, and in no distress.   Morbidly obese   HENT:   Head: Normocephalic and atraumatic.   Mouth/Throat: Oropharynx is clear and moist.   Eyes: Pupils are equal, round, and reactive to light. Conjunctivae and EOM are normal.   Neck: Normal range of motion. Neck supple.   Cardiovascular: Normal rate, regular rhythm, normal heart sounds and intact distal pulses.    Pulmonary/Chest: Effort normal and breath sounds normal.   Abdominal: Soft. Bowel sounds are normal.   Musculoskeletal: Normal range of motion.   Neurological: She is alert and oriented to person, place, and time.   3/5 strength in LLE, 4/5 strength in RLE. Numbness up to upper thigh L > R   Skin: Skin is warm and dry.   Psychiatric: Mood, memory, affect and judgment normal.             Assessment/Plan     Leg weakness   Assessment & Plan    Presented after ground level fall. Back pain and leg weakness and neuropathy with findings of L4-L5 spinal stenosis on MRI. Neurosurgery consulted, but not planning on surgery given her weight and leg weakness more related to her prior CVA. Given her stenosis is mild may not warrant surgery. She can discharge with either outpatient or  PT and PCP follow-up for pain management (she will need to schedule with [facility] for primary care).  - PT recommends home health, will try to arrange this  - Fall precautions  - Continue oxycodone 15mg q6h  PRN  - Follow up with neurosurgery after discharge     Depression   Assessment & Plan    Continue home fluoxetine 60mg     Insomnia   Assessment & Plan    Continue home zolpidem 10mg     Hypothyroidism   Assessment & Plan    TSH elevated, likely from not taking her medications.  Continue home levothyroxine 175mcg     History of DVT (deep vein thrombosis)   Assessment & Plan    Takes xarelto.   - Lovenox 120mg BID  - Per pharmacy, will need outpatient follow-up in coagulation clinic for home xarelto.     AF (atrial fibrillation) ([facility])   Assessment & Plan    Continue home diltiazem 60mg BID

## 2018-12-14 NOTE — CARE PLAN
Problem: Safety  Goal: Will remain free from falls  Outcome: NOT MET  Pt fell coming back from bathroom.     Problem: Knowledge Deficit  Goal: Knowledge of disease process/condition, treatment plan, diagnostic tests, and medications will improve  Outcome: PROGRESSING AS EXPECTED  Pt educated on plan of care and disease process. All questions answered. Pt demonstrates understanding.

## 2018-12-14 NOTE — PROGRESS NOTES
Pt notified me that she slipped and fell when walking back to bed from the bathroom. Pt was upself at the time. She has pain in her L ribs, MD notified, X-ray ordered. Pain meds administered, no new med orders at this time.

## 2018-12-14 NOTE — DISCHARGE PLANNING
Anticipated Discharge Disposition:   Home with HHC    Action:    Called PFA to find out status of Medicaid screening and unknown timeframe stated.  Emailed PFA and CCd Nohemi Whitehead, supervisor to inquire on status of Medicaid screening and if it will be completed today.    Barriers to Discharge:    No insurance    Plan:    Complete ALEXANDRA form after PFA screening.  Submit ALEXANDRA to Hospital Care     Action:    -Spoke with Hospital Care Management Manager and submitted ALEXANDRA form.  Manager instructed RN ROMAIN to submit choice forms after orders completed.  -Telephoned Dr. Whitehead to update DME for oxygen to include nocturnal oximetry study results.  -Choice forms for HHC and oxygen faxed to [id number]  -Spoke with patient and she would like f/u appointment with [facility] and will pay out-of-pocket.  Telephoned [phone] and patient will be scheduled for f/u appointment.    Plan:  Approved services submit for oxygen and HHC

## 2018-12-14 NOTE — PROGRESS NOTES
Received report from day PETER Nuñez. Assumed care at 1900. Pt A&Ox4. Reporting 6/10 back pain, medicated per MAR. VSS, no SOB or signs of discomfort. Discussed plan of care with pt. Pt resting comfortably, treaded socks in place, bed locked and in lowest position, call light in reach.

## 2018-12-14 NOTE — PROGRESS NOTES
Bedside report received from night RN.  Patient A+Ox4.  Patient c/o L sided pain, heat packs at bedside.  Safety precautions in use.  Call light within reach.  Patient educated on the importance of calling, states understanding.

## 2018-12-14 NOTE — PROGRESS NOTES
Internal Medicine Interval Note  Note Author: vE Whitehead M.D.     Name Fabi Najera       1970   Age/Sex 48 y.o. female   MRN [id number]   Code Status FULL     After 5PM or if no immediate response to page, please call for cross-coverage  Attending/Team: Phoebe/Adam See Patient List for primary contact information  Call [phone] to page    1st Call - Day Intern (R1):   [place] 2nd Call - Day Sr. Resident (R2/R3):   Eduardo         Reason for interval visit  (Principal Problem)   Progressive weakness      Interval Problem Daily Status Update  (24 hours, problem oriented, brief subjective history, new lab/imaging data pertinent to that problem)   - Working on HH PT and home O2     Review of Systems   Constitutional: Negative for chills and fever.   HENT: Negative for congestion and sore throat.    Eyes: Negative for blurred vision and double vision.   Respiratory: Negative for cough and shortness of breath.    Cardiovascular: Positive for leg swelling. Negative for chest pain.   Gastrointestinal: Negative for nausea and vomiting.   Genitourinary: Negative for dysuria and urgency.   Musculoskeletal: Positive for back pain and falls.   Skin: Negative for itching and rash.   Neurological: Negative for dizziness and headaches.   Endo/Heme/Allergies: Negative for environmental allergies. Does not bruise/bleed easily.   Psychiatric/Behavioral: Negative for depression. The patient is not nervous/anxious.        Disposition/Barriers to discharge:   None    Consultants/Specialty  Neurosurgery    PCP: No primary care provider on file.      Quality Measures  Quality-Core Measures   Reviewed items::  Labs reviewed and Medications reviewed  DVT prophylaxis pharmacological::  Enoxaparin (Lovenox)          Physical Exam       Vitals:    18 1510 18 1722 18 2000 18 0330   BP: 115/57 112/63 120/72 115/66   Pulse: 87 82 84 82   Resp: 18  16 16   Temp: 36.7 °C (98.1 °F)   36.4 °C (97.5 °F) 36.1 °C (96.9 °F)   TempSrc: Temporal  Temporal Temporal   SpO2: 93%  92% 94%   Weight:       Height:         Body mass index is 52.09 kg/m².   Oxygen Therapy:  Pulse Oximetry: 94 %, O2 (LPM): 0, O2 Delivery: None (Room Air)    Physical Exam   Constitutional: She is oriented to person, place, and time and well-developed, well-nourished, and in no distress.   Morbidly obese   HENT:   Head: Normocephalic and atraumatic.   Mouth/Throat: Oropharynx is clear and moist.   Eyes: Pupils are equal, round, and reactive to light. Conjunctivae and EOM are normal.   Neck: Normal range of motion. Neck supple.   Cardiovascular: Normal rate, regular rhythm, normal heart sounds and intact distal pulses.    Pulmonary/Chest: Effort normal and breath sounds normal.   Abdominal: Soft. Bowel sounds are normal.   Musculoskeletal: Normal range of motion.   Neurological: She is alert and oriented to person, place, and time.   3/5 strength in LLE, 4/5 strength in RLE. Numbness up to upper thigh L > R   Skin: Skin is warm and dry.   Psychiatric: Mood, memory, affect and judgment normal.             Assessment/Plan     Leg weakness   Assessment & Plan    Presented after ground level fall. Back pain and leg weakness and neuropathy with findings of L4-L5 spinal stenosis on MRI. Neurosurgery consulted, but not planning on surgery given her weight and leg weakness more related to her prior CVA. Given her stenosis is mild may not warrant surgery. She can discharge with either outpatient or  PT and PCP follow-up for pain management, she would like to schedule with [facility].  - PT recommends home health, will try to arrange this  - Fall precautions  - Continue oxycodone 15mg q6h PRN  - Follow up with neurosurgery after discharge     Depression   Assessment & Plan    Continue home fluoxetine 60mg     Insomnia   Assessment & Plan    Continue home zolpidem 10mg     Hypothyroidism   Assessment & Plan    TSH elevated, likely from not  taking her medications.  Continue home levothyroxine 175mcg     History of DVT (deep vein thrombosis)   Assessment & Plan    Takes xarelto.   - Lovenox 120mg BID  - Per pharmacy, will need outpatient follow-up in coagulation clinic for home xarelto.     AF (atrial fibrillation) ([facility])   Assessment & Plan    Continue home diltiazem 60mg BID

## 2018-12-15 ENCOUNTER — HOME HEALTH ADMISSION (OUTPATIENT)
Dept: HOME HEALTH SERVICES | Facility: HOME HEALTHCARE | Age: 48
End: 2018-12-15

## 2018-12-15 PROCEDURE — 700112 HCHG RX REV CODE 229: Performed by: NEUROLOGICAL SURGERY

## 2018-12-15 PROCEDURE — A9270 NON-COVERED ITEM OR SERVICE: HCPCS | Performed by: INTERNAL MEDICINE

## 2018-12-15 PROCEDURE — A9270 NON-COVERED ITEM OR SERVICE: HCPCS | Performed by: NEUROLOGICAL SURGERY

## 2018-12-15 PROCEDURE — 700102 HCHG RX REV CODE 250 W/ 637 OVERRIDE(OP): Performed by: NEUROLOGICAL SURGERY

## 2018-12-15 PROCEDURE — 99231 SBSQ HOSP IP/OBS SF/LOW 25: CPT | Mod: GC | Performed by: INTERNAL MEDICINE

## 2018-12-15 PROCEDURE — 770001 HCHG ROOM/CARE - MED/SURG/GYN PRIV*

## 2018-12-15 PROCEDURE — A9270 NON-COVERED ITEM OR SERVICE: HCPCS | Performed by: STUDENT IN AN ORGANIZED HEALTH CARE EDUCATION/TRAINING PROGRAM

## 2018-12-15 PROCEDURE — 700102 HCHG RX REV CODE 250 W/ 637 OVERRIDE(OP): Performed by: INTERNAL MEDICINE

## 2018-12-15 PROCEDURE — 700102 HCHG RX REV CODE 250 W/ 637 OVERRIDE(OP): Performed by: STUDENT IN AN ORGANIZED HEALTH CARE EDUCATION/TRAINING PROGRAM

## 2018-12-15 PROCEDURE — 700111 HCHG RX REV CODE 636 W/ 250 OVERRIDE (IP): Performed by: STUDENT IN AN ORGANIZED HEALTH CARE EDUCATION/TRAINING PROGRAM

## 2018-12-15 RX ADMIN — DIPHENHYDRAMINE HCL 25 MG: 25 TABLET ORAL at 23:59

## 2018-12-15 RX ADMIN — DOCUSATE SODIUM 100 MG: 100 CAPSULE, LIQUID FILLED ORAL at 05:25

## 2018-12-15 RX ADMIN — LEVOTHYROXINE SODIUM 175 MCG: 150 TABLET ORAL at 05:25

## 2018-12-15 RX ADMIN — ANTACID TABLETS 500 MG: 500 TABLET, CHEWABLE ORAL at 05:26

## 2018-12-15 RX ADMIN — OXYCODONE HYDROCHLORIDE 15 MG: 5 TABLET ORAL at 11:56

## 2018-12-15 RX ADMIN — ENOXAPARIN SODIUM 120 MG: 150 INJECTION SUBCUTANEOUS at 18:16

## 2018-12-15 RX ADMIN — ANTACID TABLETS 500 MG: 500 TABLET, CHEWABLE ORAL at 18:15

## 2018-12-15 RX ADMIN — CARISOPRODOL 350 MG: 350 TABLET ORAL at 08:48

## 2018-12-15 RX ADMIN — DILTIAZEM HYDROCHLORIDE 60 MG: 60 TABLET, FILM COATED ORAL at 18:20

## 2018-12-15 RX ADMIN — ENOXAPARIN SODIUM 120 MG: 150 INJECTION SUBCUTANEOUS at 05:25

## 2018-12-15 RX ADMIN — FLUOXETINE HYDROCHLORIDE 60 MG: 20 CAPSULE ORAL at 05:26

## 2018-12-15 RX ADMIN — OXYCODONE HYDROCHLORIDE 15 MG: 5 TABLET ORAL at 18:15

## 2018-12-15 RX ADMIN — OXYCODONE HYDROCHLORIDE 15 MG: 5 TABLET ORAL at 05:31

## 2018-12-15 RX ADMIN — ZOLPIDEM TARTRATE 10 MG: 5 TABLET ORAL at 21:14

## 2018-12-15 RX ADMIN — DILTIAZEM HYDROCHLORIDE 60 MG: 60 TABLET, FILM COATED ORAL at 05:25

## 2018-12-15 RX ADMIN — CARISOPRODOL 350 MG: 350 TABLET ORAL at 16:34

## 2018-12-15 RX ADMIN — VITAMIN D, TAB 1000IU (100/BT) 5000 UNITS: 25 TAB at 05:25

## 2018-12-15 ASSESSMENT — PAIN SCALES - GENERAL
PAINLEVEL_OUTOF10: 6
PAINLEVEL_OUTOF10: 8
PAINLEVEL_OUTOF10: 6
PAINLEVEL_OUTOF10: 8
PAINLEVEL_OUTOF10: 6
PAINLEVEL_OUTOF10: 8

## 2018-12-15 NOTE — DISCHARGE PLANNING
Anticipated Discharge Disposition:   Home with [facility] and oxygen    Action:    -Approved services form for oxygen faxed to HILARY Shirin at #[id number].  [employer] accepting.  -Telephoned [facility] and spoke with Sandra regarding determination.  She stated that she will await an ALEXANDRA and that it will need to be processed 12-17-18 after review by her supervisor Jennifer and a physician.  -Telephoned Nata Carrasco regarding communication with Sandra.    -Email sent to Nata with approved services form for [facility] and communication with Sandra.  -Left message at #[phone] Anticoagulation clinic to schedule a follow up appointment for patient.  RN CM extension provided and patient's phone number given.      Barriers to Discharge:    No insurance coverage    Plan:    Await approved services determination for [facility].    Addendum:  Action:  Oxygen tanks x 2 delivered to patients room for home use.  -Oxygen concentrator cannot be delivered until 12-17-18 per patient.  -Telephoned Shirin #[id number] to verify when oxygen concentrator can be delivered to patient's home.  -Telephoned Nata and she said she had not heard yet from [facility]    Plan:  Confirm oxygen concentration delivery date.  Confirm [facility]

## 2018-12-15 NOTE — DISCHARGE PLANNING
Received Choice form at 0845  Agency/Facility Name: [facility]  Referral sent per Choice form @ 0845  Received Approved Services, faxed to "[employer].

## 2018-12-15 NOTE — DISCHARGE PLANNING
Per [facility] Shira, [employer] has the cheapest rental of O2 equipment.  REGGIE faxed updated choice form and approved service form to [facility] Shirin.      PETER Desouza will f/u tomorrow morning.

## 2018-12-16 LAB — EKG IMPRESSION: NORMAL

## 2018-12-16 PROCEDURE — 700102 HCHG RX REV CODE 250 W/ 637 OVERRIDE(OP): Performed by: STUDENT IN AN ORGANIZED HEALTH CARE EDUCATION/TRAINING PROGRAM

## 2018-12-16 PROCEDURE — 700112 HCHG RX REV CODE 229: Performed by: NEUROLOGICAL SURGERY

## 2018-12-16 PROCEDURE — 700102 HCHG RX REV CODE 250 W/ 637 OVERRIDE(OP): Performed by: NEUROLOGICAL SURGERY

## 2018-12-16 PROCEDURE — A9270 NON-COVERED ITEM OR SERVICE: HCPCS | Performed by: NEUROLOGICAL SURGERY

## 2018-12-16 PROCEDURE — A9270 NON-COVERED ITEM OR SERVICE: HCPCS | Performed by: STUDENT IN AN ORGANIZED HEALTH CARE EDUCATION/TRAINING PROGRAM

## 2018-12-16 PROCEDURE — 99231 SBSQ HOSP IP/OBS SF/LOW 25: CPT | Mod: GC | Performed by: INTERNAL MEDICINE

## 2018-12-16 PROCEDURE — A9270 NON-COVERED ITEM OR SERVICE: HCPCS | Performed by: INTERNAL MEDICINE

## 2018-12-16 PROCEDURE — 700102 HCHG RX REV CODE 250 W/ 637 OVERRIDE(OP): Performed by: INTERNAL MEDICINE

## 2018-12-16 PROCEDURE — 770001 HCHG ROOM/CARE - MED/SURG/GYN PRIV*

## 2018-12-16 PROCEDURE — 700111 HCHG RX REV CODE 636 W/ 250 OVERRIDE (IP): Performed by: STUDENT IN AN ORGANIZED HEALTH CARE EDUCATION/TRAINING PROGRAM

## 2018-12-16 RX ADMIN — STANDARDIZED SENNA CONCENTRATE AND DOCUSATE SODIUM 1 TABLET: 8.6; 5 TABLET, FILM COATED ORAL at 20:14

## 2018-12-16 RX ADMIN — LEVOTHYROXINE SODIUM 175 MCG: 150 TABLET ORAL at 04:40

## 2018-12-16 RX ADMIN — OXYCODONE HYDROCHLORIDE 15 MG: 5 TABLET ORAL at 00:19

## 2018-12-16 RX ADMIN — OXYCODONE HYDROCHLORIDE 15 MG: 5 TABLET ORAL at 08:56

## 2018-12-16 RX ADMIN — DOCUSATE SODIUM 100 MG: 100 CAPSULE, LIQUID FILLED ORAL at 16:51

## 2018-12-16 RX ADMIN — ANTACID TABLETS 500 MG: 500 TABLET, CHEWABLE ORAL at 04:39

## 2018-12-16 RX ADMIN — OXYCODONE HYDROCHLORIDE 15 MG: 5 TABLET ORAL at 14:54

## 2018-12-16 RX ADMIN — ZOLPIDEM TARTRATE 10 MG: 5 TABLET ORAL at 20:14

## 2018-12-16 RX ADMIN — VITAMIN D, TAB 1000IU (100/BT) 5000 UNITS: 25 TAB at 04:40

## 2018-12-16 RX ADMIN — ENOXAPARIN SODIUM 120 MG: 150 INJECTION SUBCUTANEOUS at 16:51

## 2018-12-16 RX ADMIN — FLUOXETINE HYDROCHLORIDE 60 MG: 20 CAPSULE ORAL at 04:39

## 2018-12-16 RX ADMIN — OXYCODONE HYDROCHLORIDE 15 MG: 5 TABLET ORAL at 21:41

## 2018-12-16 RX ADMIN — DILTIAZEM HYDROCHLORIDE 60 MG: 60 TABLET, FILM COATED ORAL at 04:39

## 2018-12-16 RX ADMIN — ANTACID TABLETS 500 MG: 500 TABLET, CHEWABLE ORAL at 16:51

## 2018-12-16 RX ADMIN — DOCUSATE SODIUM 100 MG: 100 CAPSULE, LIQUID FILLED ORAL at 04:40

## 2018-12-16 RX ADMIN — ENOXAPARIN SODIUM 120 MG: 150 INJECTION SUBCUTANEOUS at 04:39

## 2018-12-16 RX ADMIN — CARISOPRODOL 350 MG: 350 TABLET ORAL at 04:57

## 2018-12-16 RX ADMIN — DILTIAZEM HYDROCHLORIDE 60 MG: 60 TABLET, FILM COATED ORAL at 16:51

## 2018-12-16 ASSESSMENT — ENCOUNTER SYMPTOMS
SORE THROAT: 0
DOUBLE VISION: 0
BLURRED VISION: 0
BRUISES/BLEEDS EASILY: 0
NERVOUS/ANXIOUS: 0
FALLS: 1
HEADACHES: 0
CHILLS: 0
DEPRESSION: 0
NAUSEA: 0
SHORTNESS OF BREATH: 0
COUGH: 0
FEVER: 0
BACK PAIN: 1
DIZZINESS: 0
VOMITING: 0

## 2018-12-16 ASSESSMENT — PAIN SCALES - GENERAL
PAINLEVEL_OUTOF10: 8
PAINLEVEL_OUTOF10: 0
PAINLEVEL_OUTOF10: 7
PAINLEVEL_OUTOF10: 7

## 2018-12-16 NOTE — PROGRESS NOTES
Spoke with the pharmacy and patient nurse, will try to get the bottle of xeralto from patient daughters' home to verify this prescription, since pharmacy has no information about the name of the pharmacy she told us early morning located in [place], [place].

## 2018-12-16 NOTE — PROGRESS NOTES
Received care of pt from NOC RN this am. Pt is A+O. Pt medicated with soma po prn by request for pain control this am.

## 2018-12-16 NOTE — PROGRESS NOTES
Internal Medicine Interval Note  Note Author: Ev Whitehead M.D.     Name Fabi Najera       1970   Age/Sex 48 y.o. female   MRN [id number]   Code Status FULL     After 5PM or if no immediate response to page, please call for cross-coverage  Attending/Team: Phoebe/Adam See Patient List for primary contact information  Call [phone] to page    1st Call - Day Intern (R1):   Ventura 2nd Call - Day Sr. Resident (R2/R3):   Eduardo         Reason for interval visit  (Principal Problem)   Progressive weakness      Interval Problem Daily Status Update  (24 hours, problem oriented, brief subjective history, new lab/imaging data pertinent to that problem)   - No acute events overnight  - No falls yesterday, understands that needs nursing assistance before attempting to get out of bed  - Can discharge with HH PT and home O2, likely tomorrow     Review of Systems   Constitutional: Negative for chills and fever.   HENT: Negative for congestion and sore throat.    Eyes: Negative for blurred vision and double vision.   Respiratory: Negative for cough and shortness of breath.    Cardiovascular: Positive for leg swelling. Negative for chest pain.   Gastrointestinal: Negative for nausea and vomiting.   Genitourinary: Negative for dysuria and urgency.   Musculoskeletal: Positive for back pain and falls.   Skin: Negative for itching and rash.   Neurological: Negative for dizziness and headaches.   Endo/Heme/Allergies: Negative for environmental allergies. Does not bruise/bleed easily.   Psychiatric/Behavioral: Negative for depression. The patient is not nervous/anxious.        Disposition/Barriers to discharge:   None    Consultants/Specialty  Neurosurgery    PCP: No primary care provider on file.      Quality Measures  Quality-Core Measures   Reviewed items::  Labs reviewed and Medications reviewed  DVT prophylaxis pharmacological::  Enoxaparin (Lovenox)          Physical Exam       Vitals:     12/15/18 0927 12/15/18 1551 12/15/18 2000 12/16/18 0400   BP: 123/72 133/62 121/64 123/75   Pulse: 76 77 85 83   Resp: 16 18 17 16   Temp: 36.9 °C (98.4 °F) 36.3 °C (97.3 °F) 36.6 °C (97.8 °F) 36.3 °C (97.3 °F)   TempSrc: Temporal Temporal Temporal Temporal   SpO2: 93% 96% 92% 95%   Weight:       Height:         Body mass index is 52.09 kg/m².   Oxygen Therapy:  Pulse Oximetry: 95 %, O2 (LPM): 0, O2 Delivery: None (Room Air)    Physical Exam   Constitutional: She is oriented to person, place, and time and well-developed, well-nourished, and in no distress.   Morbidly obese   HENT:   Head: Normocephalic and atraumatic.   Mouth/Throat: Oropharynx is clear and moist.   Eyes: Pupils are equal, round, and reactive to light. Conjunctivae and EOM are normal.   Neck: Normal range of motion. Neck supple.   Cardiovascular: Normal rate, regular rhythm, normal heart sounds and intact distal pulses.    Pulmonary/Chest: Effort normal and breath sounds normal.   Abdominal: Soft. Bowel sounds are normal.   Musculoskeletal: Normal range of motion.   Neurological: She is alert and oriented to person, place, and time.   3/5 strength in LLE, 4/5 strength in RLE. Numbness up to upper thigh L > R   Skin: Skin is warm and dry.   Psychiatric: Mood, memory, affect and judgment normal.             Assessment/Plan     Leg weakness   Assessment & Plan    Presented after ground level fall. Back pain and leg weakness and neuropathy with findings of L4-L5 spinal stenosis on MRI. Neurosurgery consulted, but not planning on surgery given her weight and leg weakness more related to her prior CVA. Given her stenosis is mild may not warrant surgery. She can discharge with either outpatient or HH PT and PCP follow-up for pain management, she would like to schedule with [facility].  - PT recommends home health, will try to arrange this  - Fall precautions  - Continue oxycodone 15mg q6h PRN  - Follow up with neurosurgery after discharge     Hypoxia      Assessment & Plan    Desats to 85% while sleeping.  - HH O2     Depression   Assessment & Plan    Continue home fluoxetine 60mg     Insomnia   Assessment & Plan    Continue home zolpidem 10mg     Hypothyroidism   Assessment & Plan    TSH elevated, likely from not taking her medications.  Continue home levothyroxine 175mcg     History of DVT (deep vein thrombosis)   Assessment & Plan    Takes xarelto.   - Lovenox 120mg BID  - Per pharmacy, will need outpatient follow-up in coagulation clinic for home xarelto.     AF (atrial fibrillation) ([facility])   Assessment & Plan    Continue home diltiazem 60mg BID

## 2018-12-16 NOTE — DISCHARGE PLANNING
Per RN ROMAIN Desouza, spoke with on call  for [employer], Juan, they have delivered portable to room.  Per Patient she declined concentrator until Monday when she will discharge. PETER Desouza advised.

## 2018-12-16 NOTE — DISCHARGE PLANNING
Thank you for the referral. We are currently waiting to confirm if the patient has a PCP. As soon as we have that information we can continue the acceptance process. Thank you for your understanding.

## 2018-12-16 NOTE — PROGRESS NOTES
Assumed care of patient at 1525. Assessment completed at bedside. No changes noted from morning assessment. All needs met at this time. Pt is instructed to call when in need of assistance. Bed in lowest and locked position. Call light within reach. Bed alarm and hourly rounding in place.

## 2018-12-16 NOTE — CARE PLAN
Problem: Safety  Goal: Will remain free from injury  Outcome: PROGRESSING AS EXPECTED  Alarms on and functioning.tray table and call light in reach.

## 2018-12-16 NOTE — CARE PLAN
Problem: Knowledge Deficit  Goal: Knowledge of disease process/condition, treatment plan, diagnostic tests, and medications will improve  Outcome: PROGRESSING AS EXPECTED  Discussed plan of care for today w/ pt this am, she is A+O, she verbalizes understanding of her plan of care, questions answered. Emotional support given. Will monitor for educational needs. Discussed getting out of bed w/o staff assist with pt this am. She states she is aware she high risk for another fall. She states she will always call for staff when in need of assistance.       Problem: Pain Management  Goal: Pain level will decrease to patient's comfort goal  Outcome: PROGRESSING AS EXPECTED  Assessing every four hrs for pain per protocol; see doc flowsheets, receiving oxycodone po prn and soma po prn for pain control today.

## 2018-12-16 NOTE — CARE PLAN
Problem: Safety  Goal: Will remain free from falls  Outcome: PROGRESSING AS EXPECTED  Educated patient regarding fall risk precautions. Reinforced use of call light. Bed alarm on, bed in lowest position, call light within reach, hourly rounding in place.     Problem: Infection  Goal: Will remain free from infection  Outcome: PROGRESSING AS EXPECTED  Educated pt on handwashing. Monitoring labs and vitals for S&S infection.

## 2018-12-16 NOTE — PROGRESS NOTES
Pt is A&Ox4. Getting up to bathroom stby assit with walker, mostly steady but still sometimes sways off balance. Ambulated hallway. L side weakness. Pain controlled with oxycodone. Denies N/V. Tolerating regular diet. Voiding without difficulty. POC discussed with pt, verbalizes understanding. Bed alarm on. Call light within reach, bed in lowest position.

## 2018-12-17 VITALS
HEIGHT: 65 IN | TEMPERATURE: 99.2 F | BODY MASS INDEX: 48.82 KG/M2 | WEIGHT: 293 LBS | OXYGEN SATURATION: 95 % | SYSTOLIC BLOOD PRESSURE: 120 MMHG | RESPIRATION RATE: 18 BRPM | HEART RATE: 87 BPM | DIASTOLIC BLOOD PRESSURE: 70 MMHG

## 2018-12-17 PROCEDURE — A9270 NON-COVERED ITEM OR SERVICE: HCPCS | Performed by: NEUROLOGICAL SURGERY

## 2018-12-17 PROCEDURE — 700102 HCHG RX REV CODE 250 W/ 637 OVERRIDE(OP): Performed by: INTERNAL MEDICINE

## 2018-12-17 PROCEDURE — 99239 HOSP IP/OBS DSCHRG MGMT >30: CPT | Mod: GC | Performed by: INTERNAL MEDICINE

## 2018-12-17 PROCEDURE — 700111 HCHG RX REV CODE 636 W/ 250 OVERRIDE (IP): Performed by: NEUROLOGICAL SURGERY

## 2018-12-17 PROCEDURE — 700102 HCHG RX REV CODE 250 W/ 637 OVERRIDE(OP): Performed by: NEUROLOGICAL SURGERY

## 2018-12-17 PROCEDURE — 700102 HCHG RX REV CODE 250 W/ 637 OVERRIDE(OP): Performed by: STUDENT IN AN ORGANIZED HEALTH CARE EDUCATION/TRAINING PROGRAM

## 2018-12-17 PROCEDURE — A9270 NON-COVERED ITEM OR SERVICE: HCPCS | Performed by: STUDENT IN AN ORGANIZED HEALTH CARE EDUCATION/TRAINING PROGRAM

## 2018-12-17 PROCEDURE — A9270 NON-COVERED ITEM OR SERVICE: HCPCS | Performed by: INTERNAL MEDICINE

## 2018-12-17 PROCEDURE — 700112 HCHG RX REV CODE 229: Performed by: NEUROLOGICAL SURGERY

## 2018-12-17 RX ORDER — OXYCODONE HYDROCHLORIDE 15 MG/1
15 TABLET ORAL EVERY 8 HOURS PRN
Qty: 21 TAB | Refills: 0 | Status: SHIPPED | OUTPATIENT
Start: 2018-12-17 | End: 2018-12-24 | Stop reason: SDUPTHER

## 2018-12-17 RX ADMIN — DILTIAZEM HYDROCHLORIDE 60 MG: 60 TABLET, FILM COATED ORAL at 05:29

## 2018-12-17 RX ADMIN — DOCUSATE SODIUM 100 MG: 100 CAPSULE, LIQUID FILLED ORAL at 05:29

## 2018-12-17 RX ADMIN — VITAMIN D, TAB 1000IU (100/BT) 5000 UNITS: 25 TAB at 05:27

## 2018-12-17 RX ADMIN — CARISOPRODOL 175 MG: 350 TABLET ORAL at 12:36

## 2018-12-17 RX ADMIN — ONDANSETRON 4 MG: 4 TABLET, ORALLY DISINTEGRATING ORAL at 09:24

## 2018-12-17 RX ADMIN — OXYCODONE HYDROCHLORIDE 15 MG: 5 TABLET ORAL at 10:38

## 2018-12-17 RX ADMIN — OXYCODONE HYDROCHLORIDE 15 MG: 5 TABLET ORAL at 04:14

## 2018-12-17 RX ADMIN — ANTACID TABLETS 500 MG: 500 TABLET, CHEWABLE ORAL at 05:29

## 2018-12-17 RX ADMIN — LEVOTHYROXINE SODIUM 175 MCG: 150 TABLET ORAL at 05:28

## 2018-12-17 RX ADMIN — FLUOXETINE HYDROCHLORIDE 60 MG: 20 CAPSULE ORAL at 05:28

## 2018-12-17 RX ADMIN — OXYCODONE HYDROCHLORIDE 15 MG: 5 TABLET ORAL at 16:56

## 2018-12-17 ASSESSMENT — PAIN SCALES - GENERAL
PAINLEVEL_OUTOF10: 6
PAINLEVEL_OUTOF10: 9

## 2018-12-17 NOTE — PROGRESS NOTES
Pt is expressing interest in a weight loss plan. At beginning of shift was having extreme anxiety and upset about the prospect of having to go home on oxygen and CPAP. Discussed with pt that many of her health problems right now are stemming from overweight, and that it is possible she may not have to be tied to O2 and CPAP forever, if she could lose weight through changes in diet and exercise. Nutrition consult placed.     Pt is A&Ox4. Getting up to bathroom stby assit with walker, mostly steady but still sometimes sways off balance. Ambulated hallway. L side weakness. Pain controlled with oxycodone. Denies N/V. Tolerating regular diet. Voiding without difficulty. POC discussed with pt, verbalizes understanding. Bed alarm on. Call light within reach, bed in lowest position.

## 2018-12-17 NOTE — CARE PLAN
Problem: Communication  Goal: The ability to communicate needs accurately and effectively will improve  Outcome: PROGRESSING AS EXPECTED  Pt communicates needs effectively.   Call light w/in reach. Hourly rounding in place.     Problem: Mobility  Goal: Risk for activity intolerance will decrease  Outcome: PROGRESSING AS EXPECTED  Pt is mobilizing often & tolerating it well.

## 2018-12-17 NOTE — DISCHARGE PLANNING
Anticipated Discharge Disposition:   Home with [facility]    Action:      -RN CM spoke with patient. She is agreeable to dc today and stated her transportation home available about 4 pm.    -Telephoned [employer] to arranged delivery of oxygen concentrator #[phone] spoke with Pat who stated that it can be delivered today. Have patient call [employer] when home. If after 5 pm to ask for on call .    -Spoke with David from [facility] and can accept with physician who will assume outpaitent PCP care until patient has 1st appointment with Dr. Raya on 12-24-18. They will sign the orders for [facility].      -Please fax the continuing [facility] orders to [phone] c/o Dr. Dale attending and Dr. Clark resident with [facility].    -Spoke with Dr. Clark and will prescribe Xarelto. No anticoagulation clinic follow up required.    Barriers to Discharge:    [facility] orders completion and processing    Plan:    DC to home with [facility]  Wait for orders to be signed.  Collaborate with [facility], [facility], [facility] Physicians.    Addendum:  Received telephone call from Cyndee with [facility] and patient approved for services. The nurse will meet with the patient at her home and initiate the orders at that time for Dr. Dale.    Addendum:  Fax Inquiry to [facility] for Xarelto pricing quote.  Received return call from [facility]. They can provide medication at no cost with coupon they have.  They are open until 5 pm.    Addendum:  RN CM walked to [facility] to  Xarelto prescription. Medication delivered to bedside PETER Lai.    Addendum:  Pt.'s son cannot pickup until approx. 2030.  Pt. Agreeable to taxi.  Taxi voucher given to Joelle GREEN bedside nurse to give to patient.

## 2018-12-17 NOTE — DISCHARGE INSTRUCTIONS
Discharge Instructions    Discharged to home by car with relative. Discharged via wheelchair, hospital escort: Yes.  Special equipment needed: Oxygen    Be sure to schedule a follow-up appointment with your primary care doctor or any specialists as instructed.     Discharge Plan:   Diet Plan: Discussed  Activity Level: Discussed  Confirmed Follow up Appointment: Appointment Scheduled  Confirmed Symptoms Management: Discussed  Medication Reconciliation Updated: Yes  Influenza Vaccine Indication: Not indicated: Previously immunized this influenza season and > 8 years of age    I understand that a diet low in cholesterol, fat, and sodium is recommended for good health. Unless I have been given specific instructions below for another diet, I accept this instruction as my diet prescription.   Other diet: Regular     Special Instructions: None    · Is patient discharged on Warfarin / Coumadin?   No     Depression / Suicide Risk    As you are discharged from this [facility] facility, it is important to learn how to keep safe from harming yourself.    Recognize the warning signs:  · Abrupt changes in personality, positive or negative- including increase in energy   · Giving away possessions  · Change in eating patterns- significant weight changes-  positive or negative  · Change in sleeping patterns- unable to sleep or sleeping all the time   · Unwillingness or inability to communicate  · Depression  · Unusual sadness, discouragement and loneliness  · Talk of wanting to die  · Neglect of personal appearance   · Rebelliousness- reckless behavior  · Withdrawal from people/activities they love  · Confusion- inability to concentrate     If you or a loved one observes any of these behaviors or has concerns about self-harm, here's what you can do:  · Talk about it- your feelings and reasons for harming yourself  · Remove any means that you might use to hurt yourself (examples: pills, rope, extension cords, firearm)  · Get  professional help from the community (Mental Health, Substance Abuse, psychological counseling)  · Do not be alone:Call your Safe Contact- someone whom you trust who will be there for you.  · Call your local CRISIS HOTLINE 231-8546 or 570-284-6760  · Call your local Children's Mobile Crisis Response Team [place] (268) 792-4626 or www.[employer]  · Call the toll free National Suicide Prevention Hotlines   · National Suicide Prevention Lifeline 223-724-XKHM (8347)  · National Hope Line Network 800-SUICIDE (339-4795)

## 2018-12-17 NOTE — DISCHARGE PLANNING
Home Health to accept this referral pending patient's completion of appointment to establish with Ramez Raya MD  scheduled for 12/24/18.  Thank you!

## 2018-12-17 NOTE — DISCHARGE SUMMARY
Internal Medicine Discharge Summary  Note Author: Andrez Clark M.D.       Admit Date:  12/10/2018       Discharge Date:   12/17/2018     Service:   [facility] Internal Medicine Green Team  Attending Physician(s):  Chito       Senior Resident(s):   Michela  Filiberto Resident(s):   Cathryn  PCP: Ramez Raya M.D.    Primary Diagnosis:   Spinal stenosis, mild with bilateral leg weakness  Pain management for legs pain    Secondary Diagnoses:                Active Problems:    Leg weakness POA: Unknown    Hypoxia POA: Unknown    AF (atrial fibrillation) (HCC) POA: Unknown    History of DVT (deep vein thrombosis) POA: Unknown    Hypothyroidism POA: Unknown    Insomnia POA: Unknown    Depression POA: Unknown    Obstructive sleep apnea syndrome POA: Unknown  Resolved Problems:    * No resolved hospital problems. *      Hospital Summary (Brief Narrative):       Christy Najera is a 48 y.o. Female with past history of chronic low back pain related to spinal stenosis, history of recurrent pulmonary embolism/DVTs on Xarelto as outpatient per patient report, reported was prescribed by her PCP in [place], hypertension, depression, sleep apnea, she lost her CPAP during moving from [place] to [place] to live with her daughter, and hypothyroidism.  Presented to the hospital with lower extremity weakness and ground-level fall, denies any alarm signs, was found with mild  L4-L5 spinal stenosis , neurosurgery consulted on admission recommended  no surgical intervention and they will follow up with the patient as outpatient, patient treated mainly for lower extremities pain with pain medications, physical therapy recommended no inpatient PT needs, she only need home health on discharge, patient during hospitalization pain controlled with 15 mg oxycodone every 6 hours, chronic history of PE treated with Lovenox weight-based dose, found dsat during the nighttime to 85% and was recommended to be discharged with  oxygen therapy until she get the CPAP.  Home health was established before discharge, PCP with Dr. Raya an appointment on 24 December to continue care, we were unable to verify the Xarelto prescription by the pharmacy or get the patient to bring the bottles however patient confirmed that she had prescriptions for the previous PE history.  Patient discharged stable on pain medication a 1 week supply, oxygen therapy, to follow-up with PCP.    Things to Follow  1.  Clarify with the patient PE history and get medical records from prior PCP/Xarelto ordered on discharge for 1 month supply  2.  She will need formal sleep study and CPAP order  3.  Follow-up with the neurosurgery as outpatient  4.  Refill  her medication list    Patient /Hospital Summary (Details -- Problem Oriented) :          Leg weakness   Assessment & Plan    Presented after ground level fall. Back pain and leg weakness and neuropathy with findings of L4-L5 spinal stenosis on MRI. Neurosurgery consulted, but not planning on surgery given her weight and leg weakness more related to her prior CVA. Given her stenosis is mild may not warrant surgery. She can discharge with either outpatient or  PT and PCP follow-up for pain management, she would like to schedule with [facility].  - PT recommends home health, will try to arrange this  - Fall precautions  - Continue oxycodone 15mg q6h PRN  - Follow up with neurosurgery after discharge     Hypoxia   Assessment & Plan    Desats to 85% while sleeping.  -  O2     Depression   Assessment & Plan    Continue home fluoxetine 60mg     Insomnia   Assessment & Plan    Continue home zolpidem 10mg     Hypothyroidism   Assessment & Plan    TSH elevated, likely from not taking her medications.  Continue home levothyroxine 175mcg     History of DVT (deep vein thrombosis)   Assessment & Plan    Takes xarelto.   - Lovenox 120mg BID  - Per pharmacy, will need outpatient follow-up in coagulation clinic for home xarelto.        AF (atrial fibrillation) ([facility])   Assessment & Plan    Continue home diltiazem 60mg BID         Consultants:     Neurosurgery    Procedures:        None    Imaging/ Testing:      DX-CHEST-LIMITED (1 VIEW)   Final Result      No displaced rib fractures. No pneumothorax.      DX-LUMBAR SPINE-BEND OR FLEX-EXT   Final Result      1.  Moderate multilevel spondylosis deformans.   2.  Grade 1 anterolisthesis at L4-5 with findings suggesting mild instability.      DX-LUMBAR SPINE-2 OR 3 VIEWS   Final Result      1.  Multilevel spondylosis deformans.   2.  Grade 1 anterolisthesis at L4-5.   3.  No gross lumbar spine fracture.      MR-LUMBAR SPINE-W/O   Final Result   Addendum 1 of 1   Addendum: The study was performed on the 3T MRI scanner.      At the level of L2-3, there is right foraminal disc protrusion causing    stenosis of the inferior portion of right L2 neural foramina. The right L2    nerve root might have been impinged at the neural foramina.      Final      1.  There is mild anterolisthesis of L4 on 5.   2.  There are combinations of facet joint arthropathy and ligamentum flavum hypertrophy causing mild central canal stenosis at L4-5.      DX-CHEST-PORTABLE (1 VIEW)   Final Result      No acute cardiac or pulmonary abnormalities are identified.          Discharge Medications:         Medication Reconciliation: Completed       Medication List      START taking these medications      Instructions   rivaroxaban 20 MG Tabs tablet  Start taking on:  12/18/2018  Commonly known as:  XARELTO   Take 1 Tab by mouth every day.  Dose:  20 mg        CHANGE how you take these medications      Instructions   oxycodone 15 MG immediate release tablet  What changed:  · medication strength  · how much to take  · when to take this  · reasons to take this  Commonly known as:  OXY-IR   Take 1 Tab by mouth every 8 hours as needed for up to 7 days.  Dose:  15 mg        CONTINUE taking these medications      Instructions   carisoprodol  350 MG Tabs  Commonly known as:  SOMA   Take 350 mg by mouth every 8 hours as needed for Muscle Spasms.  Dose:  350 mg     DILTIAZem 60 MG Tabs  Commonly known as:  CARDIZEM   Take 60 mg by mouth 2 Times a Day.  Dose:  60 mg     FLUoxetine 20 MG Caps  Commonly known as:  PROZAC   Take 60 mg by mouth every day.  Dose:  60 mg     levothyroxine 175 MCG Tabs  Commonly known as:  SYNTHROID   Take 175 mcg by mouth Every morning on an empty stomach.  Dose:  175 mcg     ondansetron 4 MG Tbdp  Commonly known as:  ZOFRAN ODT   Take 4 mg by mouth every 8 hours as needed for Nausea.  Dose:  4 mg        STOP taking these medications    AMBIEN 10 MG Tabs  Generic drug:  zolpidem            Can use .DISCHARGEMEDSLIST if going to another facility     Disposition:   Home with home health/lives with her daughter    Diet:   Regular    Activity:   As tolerated    Instructions:      The patient was instructed to return to the ER in the event of worsening symptoms. I have counseled the patient on the importance of compliance and the patient has agreed to proceed with all medical recommendations and follow up plan indicated above.   The patient understands that all medications come with benefits and risks. Risks may include permanent injury or death and these risks can be minimized with close reassessment and monitoring.        Primary Care Provider: Dr. Raya at [facility]  Discharge summary faxed to primary care provider:  Completed  Copy of discharge summary given to the patient: Completed      Follow up appointment details :      On December 24 with PCP    Pending Studies:        None     Time spent on discharge day patient visit, preparing discharge paperwork and arranging for patient follow up.    Discharge Time (Minutes) :  50 mins  Hospital Course Type: Inpatient Stay < 2 midnights, patient recovered more rapidly than anticipated      Condition on Discharge     ______________________________________________________________________    Interval history/exam for day of discharge:    Denies active issues, able to ambulate to the bathroom.    Vitals:    12/16/18 1551 12/16/18 2000 12/17/18 0400 12/17/18 0800   BP: 124/68 145/73 134/71 138/83   Pulse: 89 84 75 74   Resp: 16 16 14 17   Temp: 37.1 °C (98.7 °F) 36.6 °C (97.9 °F) 36.4 °C (97.6 °F) 36.6 °C (97.9 °F)   TempSrc: Temporal Temporal Temporal Temporal   SpO2: 93%   95%   Weight:       Height:         Weight/BMI: Body mass index is 52.09 kg/m².  Pulse Oximetry: 95 %, O2 (LPM): 0, O2 Delivery: None (Room Air)    General: Alert and oriented to the time place person  CVS: Normal heart sounds  PULM: Clear breath sounds  Motor strength:4/5 motor strength in lower extremities    Most Recent Labs:    Lab Results   Component Value Date/Time    WBC 6.1 12/12/2018 03:45 AM    RBC 4.13 (L) 12/12/2018 03:45 AM    HEMOGLOBIN 12.5 12/12/2018 03:45 AM    HEMATOCRIT 38.9 12/12/2018 03:45 AM    MCV 94.2 12/12/2018 03:45 AM    MCH 30.3 12/12/2018 03:45 AM    MCHC 32.1 (L) 12/12/2018 03:45 AM    MPV 9.9 12/12/2018 03:45 AM    NEUTSPOLYS 54.00 12/11/2018 01:04 AM    LYMPHOCYTES 33.80 12/11/2018 01:04 AM    MONOCYTES 8.50 12/11/2018 01:04 AM    EOSINOPHILS 2.40 12/11/2018 01:04 AM    BASOPHILS 1.00 12/11/2018 01:04 AM      Lab Results   Component Value Date/Time    SODIUM 142 12/12/2018 03:46 AM    POTASSIUM 4.0 12/12/2018 03:46 AM    CHLORIDE 105 12/12/2018 03:46 AM    CO2 27 12/12/2018 03:46 AM    GLUCOSE 151 (H) 12/12/2018 03:46 AM    BUN 12 12/12/2018 03:46 AM    CREATININE 0.80 12/12/2018 03:46 AM      Lab Results   Component Value Date/Time    ALTSGPT 15 12/11/2018 01:04 AM    ASTSGOT 14 12/11/2018 01:04 AM    ALKPHOSPHAT 74 12/11/2018 01:04 AM    TBILIRUBIN 0.3 12/11/2018 01:04 AM    ALBUMIN 3.6 12/11/2018 01:04 AM    GLOBULIN 2.5 12/11/2018 01:04 AM    INR 1.11 12/12/2018 03:46 AM     Lab Results   Component Value Date/Time     PROTHROMBTM 14.4 12/12/2018 03:46 AM    INR 1.11 12/12/2018 03:46 AM

## 2018-12-17 NOTE — DISCHARGE PLANNING
ATTN: Case Management  RE: Referral for Home Health    As of 12/17/18, we have accepted the Home Health referral for the patient listed above.    A [facility] clinician will be out to see the patient within 48 hours. If you have any questions or concerns regarding the patient’s transition to Home Health, please do not hesitate to contact us at [phone].      We look forward to collaborating with you,  [facility] Home Health Team

## 2018-12-18 NOTE — PROGRESS NOTES
Discharge instructions and prescriptions provided and reviewed w/pt. All questions answered. Pt left w/hospital escort via wheel chair. All belongings taken. Cab voucher given to .

## 2018-12-24 ENCOUNTER — OFFICE VISIT (OUTPATIENT)
Dept: INTERNAL MEDICINE | Facility: MEDICAL CENTER | Age: 48
End: 2018-12-24

## 2018-12-24 VITALS
HEIGHT: 65 IN | DIASTOLIC BLOOD PRESSURE: 101 MMHG | OXYGEN SATURATION: 91 % | SYSTOLIC BLOOD PRESSURE: 156 MMHG | TEMPERATURE: 98 F | BODY MASS INDEX: 48.82 KG/M2 | WEIGHT: 293 LBS | HEART RATE: 95 BPM

## 2018-12-24 DIAGNOSIS — G47.33 OBSTRUCTIVE SLEEP APNEA SYNDROME: ICD-10-CM

## 2018-12-24 DIAGNOSIS — R29.898 WEAKNESS OF BOTH LOWER EXTREMITIES: ICD-10-CM

## 2018-12-24 DIAGNOSIS — D64.9 ANEMIA, UNSPECIFIED TYPE: ICD-10-CM

## 2018-12-24 DIAGNOSIS — I48.0 PAROXYSMAL ATRIAL FIBRILLATION (HCC): ICD-10-CM

## 2018-12-24 DIAGNOSIS — F32.A DEPRESSION, UNSPECIFIED DEPRESSION TYPE: ICD-10-CM

## 2018-12-24 DIAGNOSIS — M79.604 PAIN IN BOTH LOWER EXTREMITIES: ICD-10-CM

## 2018-12-24 DIAGNOSIS — E66.01 CLASS 3 SEVERE OBESITY WITH SERIOUS COMORBIDITY AND BODY MASS INDEX (BMI) GREATER THAN OR EQUAL TO 70 IN ADULT, UNSPECIFIED OBESITY TYPE (HCC): ICD-10-CM

## 2018-12-24 DIAGNOSIS — R53.83 OTHER FATIGUE: ICD-10-CM

## 2018-12-24 DIAGNOSIS — M79.605 PAIN IN BOTH LOWER EXTREMITIES: ICD-10-CM

## 2018-12-24 DIAGNOSIS — R73.9 HYPERGLYCEMIA: ICD-10-CM

## 2018-12-24 DIAGNOSIS — G25.81 RESTLESS LEG: ICD-10-CM

## 2018-12-24 DIAGNOSIS — M48.062 SPINAL STENOSIS OF LUMBAR REGION WITH NEUROGENIC CLAUDICATION: ICD-10-CM

## 2018-12-24 PROCEDURE — 99205 OFFICE O/P NEW HI 60 MIN: CPT | Mod: GC | Performed by: INTERNAL MEDICINE

## 2018-12-24 RX ORDER — DULOXETIN HYDROCHLORIDE 60 MG/1
60 CAPSULE, DELAYED RELEASE ORAL DAILY
COMMUNITY
End: 2018-12-26 | Stop reason: SDUPTHER

## 2018-12-24 RX ORDER — OXYCODONE HYDROCHLORIDE 15 MG/1
15 TABLET ORAL EVERY 8 HOURS PRN
Qty: 33 TAB | Refills: 0 | Status: SHIPPED | OUTPATIENT
Start: 2018-12-24 | End: 2019-01-04

## 2018-12-24 ASSESSMENT — ENCOUNTER SYMPTOMS
HEADACHES: 0
DIAPHORESIS: 1
DIZZINESS: 0
TINGLING: 0
HALLUCINATIONS: 0
INSOMNIA: 0
GASTROINTESTINAL NEGATIVE: 1
NERVOUS/ANXIOUS: 0
MEMORY LOSS: 0
FALLS: 1
MYALGIAS: 1
PALPITATIONS: 1
EYES NEGATIVE: 1
SHORTNESS OF BREATH: 1
DEPRESSION: 1
BACK PAIN: 1
WEAKNESS: 1

## 2018-12-24 ASSESSMENT — LIFESTYLE VARIABLES: SUBSTANCE_ABUSE: 0

## 2018-12-24 ASSESSMENT — PATIENT HEALTH QUESTIONNAIRE - PHQ9
SUM OF ALL RESPONSES TO PHQ QUESTIONS 1-9: 18
CLINICAL INTERPRETATION OF PHQ2 SCORE: 3
5. POOR APPETITE OR OVEREATING: 1 - SEVERAL DAYS

## 2018-12-24 NOTE — PATIENT INSTRUCTIONS
What You Need to Know About Prescription Opioid Pain Medicine  Opioids are powerful medicines that are used to treat moderate to severe pain. Opioids should be taken with the supervision of a trained health care provider. They should be taken for the shortest period of time as possible. This is because opioids can be addictive and the longer you take opioids, the greater your risk of addiction (opioid use disorder).  What do opioids do?  Opioids help reduce or eliminate pain. When used for short periods of time, they can help you:  · Sleep better.  · Do better in physical or occupational therapy.  · Feel better in the first few days after an injury.  · Recover from surgery.  What kind of problems can opioids cause?  Opioids can cause side effects, such as:  · Constipation.  · Nausea.  · Vomiting.  · Drowsiness.  · Confusion.  · Opioid use disorder.  · Breathing difficulties (respiratory depression).  Using opioid pain medicines for longer than 3 days increases your risk of these side effects.  Taking opioid pain medicine for a long period of time can affect your ability to do daily tasks. It also puts you at risk for:  · Car accidents.  · Heart attack.  · Overdose, which can sometimes lead to death.  What can increase my risk for developing problems while taking opioids?  You may be at an especially high risk for problems while taking opioids if you:  · Are over the age of 65.  · Are pregnant.  · Have kidney or liver disease.  · Have certain mental health conditions, such as depression or anxiety.  · Have a history of substance use disorder.  · Have had an opioid overdose in the past.  How do I stop taking opioids if I have been taking them for a long time?  If you have been taking opioid medicine for more than a few weeks, you may need to slowly stop taking them (taper). Tapering your use of opioids can decrease your chances of experiencing withdrawal symptoms, such as:  · Abdominal pain and  cramping.  · Nausea.  · Sweating.  · Sleepiness.  · Restlessness.  · Uncontrollable shaking (tremors).  · Cravings for the medicine.  Do not attempt to taper your use of opioids on your own. Talk with your health care provider about how to do this. Your health care provider may prescribe a step-down schedule based on how much medicine you are taking and how long you have been taking it.  What are the benefits of stopping the use of opioids?  By switching from opioid pain medicine to non-opioid pain management options, you will decrease your risk of accidents and injuries associated with long-term opioid use. You will also be able to:  · Monitor your pain more accurately and know when to seek medical care if it is not improving.  · Decrease risk to others around you. Having opioids in the home increases the risk for accidental or intentional use or overdose by others.  How can I treat pain without opioids?  Pain can be managed with many types of alternative treatments. Ask your health care provider to refer you to one or more specialists who can help you manage pain through:  · Physical or occupational therapy.  · Counseling (cognitive-behavioral therapy).  · Good nutrition.  · Biofeedback.  · Massage.  · Meditation.  · Non-opioid medicine.  · Following a gentle exercise program.  Where can I get support?  If you have been taking opioids for a long time, you may benefit from receiving support for quitting from a local support group or counselor. Ask your health care provider for a referral to these resources in your area.  When should I seek medical care?  Seek medical care right away if you are taking opioids and you experience any of the following:  · Difficulty breathing.  · Breathing that is more shallow or slower than normal.  · A very slow heartbeat (pulse).  · Severe confusion.  · Unconsciousness.  · Sleepiness.  · Difficulty waking from sleep.  · Slurred speech.  · Nausea and vomiting.  · Cold, clammy  skin.  · Blue lips or fingernails.  · Limpness.  · Abnormally small pupils.  If you think that you or someone else may have taken too much of an opioid medicine, get medical help right away. Do not wait to see if the symptoms go away on their own. Call your local emergency services (365 in the [place] or call the hotline of the National Poison Control Center (797-717-2231 in the [place]   Where can I get more information?  To learn more about opioid medicines, visit the Centers for Disease Control and Prevention web site Opioid Basics at https://www.cdc.gov/drugoverdos/opioids/index.html.  Summary  · Opioid medicines can help you manage moderate-to-severe pain for a short period of time.  · Taking opioid pain medicine for a long period of time puts you at risk for unintentional accidents, injury, and even death.  · If you think that you or someone else may have taken too much of an opioid, get medical help right away.  This information is not intended to replace advice given to you by your health care provider. Make sure you discuss any questions you have with your health care provider.  Document Released: 01/13/2017 Document Revised: 08/11/2017 Document Reviewed: 07/29/2016  [employer] Interactive Patient Education © 2017 [employer]

## 2018-12-24 NOTE — PROGRESS NOTES
New Patient to Establish    Reason to establish: New patient to establish    CC:Fall, leg pain, back pain, and restless legs.    HPI: Ms. Najera is a 48-year-old female with a past medical history is significant for paroxysmal atrial fibrillation, spinal stenosis, MAMTA, class III severe obesity, depression, hypothyroidism, and hyperglycemia.    Patient presents to the clinic today as a follow-up after being discharged from the hospital on December 17, 2018, patient was seen for a fall.  No fractures were noted on exam and imaging.  Patient states that since being discharged she has still been in pain, she notes having a lot of restless leg-like symptoms at night.  Patient states that her symptoms began when she lays down and are alleviated when she walks around and she takes her pain medication.  Patient is from [facility] and was seen there for her chronic pain as well as all of her other medical care.  Currently we do not have any medical records of her care.  Patient states that she is in chronic pain, for the last 3 years and was previously prescribed 20 mg of oxycodone every 8 hours.      Chronic problems     Paroxysmal atrial fibrillation - patient has a history of paroxysmal A. fib that was being managed by cardiologist in [facility].  Patient is on Xarelto and Cardizem.  Patient states that she has enough refills for the time being.    Lower extremity pain -patient has a history of spinal stenosis and anterolisthesis noted on imaging.  Patient states that she is on chronic opioid medication, oxycodone 20 mg.  We do not have any records that verify this.  Opioid Risk Score: 2    Interpretation of Opioid Risk Score   Score 0-3 = Low risk of abuse. Do UDS at least once per year.  Score 4-7 = Moderate risk of abuse. Do UDS 1-4 times per year.  Score 8+ = High risk of abuse. Refer to specialist.    Obesity -patient is morbidly obese, her BMI is 61.6.  Patient wishes at this time to be referred to a  weight management clinic for further intervention.    MAMTA -patient has previous been diagnosed with obstructive sleep apnea.  However, she lost her CPAP machine mask and her insurance will not cover another mass for another 2 years.  This could also be a cause of her restless leg symptoms.    Restless leg symptoms  -patient states that her legs jump and hurt when she lies down to fall asleep at night.  This could be related to her MAMTA.      Patient Active Problem List    Diagnosis Date Noted   • Leg weakness 12/10/2018     Priority: High   • Hypoxia 12/13/2018     Priority: Medium   • AF (atrial fibrillation) (HCC) 12/10/2018     Priority: Low   • History of DVT (deep vein thrombosis) 12/10/2018     Priority: Low   • Hypothyroidism 12/10/2018     Priority: Low   • Insomnia 12/10/2018     Priority: Low   • Depression 12/10/2018     Priority: Low   • Pain in both lower extremities 12/24/2018   • Restless leg 12/24/2018   • Obstructive sleep apnea syndrome 12/13/2018       Past Medical History:   Diagnosis Date   • Congestive heart failure ([facility])    • Hypertension    • Neuropathy ([facility])    • Spinal stenosis    • Stroke ([facility])        Current Outpatient Prescriptions   Medication Sig Dispense Refill   • DULoxetine (CYMBALTA) 60 MG Cap DR Particles delayed-release capsule Take 60 mg by mouth every day.     • oxycodone (OXY-IR) 15 MG immediate release tablet Take 1 Tab by mouth every 8 hours as needed for up to 11 days. 33 Tab 0   • rivaroxaban (XARELTO) 20 MG Tab tablet Take 1 Tab by mouth every day. 30 Tab 0   • levothyroxine (SYNTHROID) 175 MCG Tab Take 175 mcg by mouth Every morning on an empty stomach.     • DILTIAZem (CARDIZEM) 60 MG Tab Take 60 mg by mouth 2 Times a Day.     • ondansetron (ZOFRAN ODT) 4 MG TABLET DISPERSIBLE Take 4 mg by mouth every 8 hours as needed for Nausea.     • carisoprodol (SOMA) 350 MG Tab Take 350 mg by mouth every 8 hours as needed for Muscle Spasms.       No current facility-administered  medications for this visit.        Allergies as of 12/24/2018 - Reviewed 12/24/2018   Allergen Reaction Noted   • Cillins [penicillins]  12/10/2018   • Gabapentin Hives 12/10/2018   • Lyrica Hives 12/10/2018   • Phenergan [promethazine]  12/24/2018   • Toradol  12/10/2018   • Tramadol  12/10/2018   • Tylenol  12/10/2018       Social History     Social History   • Marital status:      Spouse name: N/A   • Number of children: N/A   • Years of education: N/A     Occupational History   • Not on file.     Social History Main Topics   • Smoking status: Never Smoker   • Smokeless tobacco: Never Used   • Alcohol use No   • Drug use: No   • Sexual activity: Not Currently     Other Topics Concern   • Not on file     Social History Narrative   • No narrative on file       Family History   Problem Relation Age of Onset   • Cancer Mother    • Heart Disease Father    • Heart Disease Brother        Past Surgical History:   Procedure Laterality Date   • APPENDECTOMY     • CHOLECYSTECTOMY     • GYN SURGERY      partial hysterectomy   • OPEN REDUCTION       Review of Systems   Constitutional: Positive for diaphoresis and malaise/fatigue.   HENT: Negative.    Eyes: Negative.    Respiratory: Positive for shortness of breath.    Cardiovascular: Positive for palpitations and leg swelling. Negative for chest pain.   Gastrointestinal: Negative.    Genitourinary: Negative.  Negative for dysuria and urgency.   Musculoskeletal: Positive for back pain, falls, joint pain and myalgias.   Skin: Negative.    Neurological: Positive for weakness. Negative for dizziness, tingling and headaches.   Endo/Heme/Allergies: Negative.    Psychiatric/Behavioral: Positive for depression. Negative for hallucinations, memory loss, substance abuse and suicidal ideas. The patient is not nervous/anxious and does not have insomnia.          /101 (BP Location: Left arm, Patient Position: Sitting)   Pulse 95   Temp 36.7 °C (98 °F) (Temporal)   Ht  "1.651 m (5' 5\")   Wt (!) 168 kg (370 lb 6.4 oz)   SpO2 91%   BMI 61.64 kg/m²     Physical Exam   Constitutional: She is oriented to person, place, and time.   Obese   HENT:   Head: Normocephalic and atraumatic.   Eyes: EOM are normal.   Neck:   Thyroidectomy scar   Cardiovascular: Normal heart sounds.  An irregularly irregular rhythm present. Tachycardia present.    Pulmonary/Chest: Effort normal and breath sounds normal.   Abdominal: Soft. Bowel sounds are normal.   Musculoskeletal: Normal range of motion. She exhibits no edema.   Neurological: She is alert and oriented to person, place, and time. No cranial nerve deficit. Coordination normal.   Psychiatric: Judgment normal. Her affect is blunt. She is not agitated and not aggressive. Thought content is not paranoid. Cognition and memory are normal. She exhibits a depressed mood.       Note: I have reviewed all pertinent labs and diagnostic tests associated with this visit with specific comments listed under the assessment and plan below    Assessment and Plan    1. Paroxysmal atrial fibrillation (HCC)  Patient notes a history of paroxysmal atrial fibrillation.  On physical exam today she was irregularly irregular.  EKG done on December 16, 2018 shows sinus rhythm.  Patient is currently on Xarelto and Cardizem, these were previously prescribed by her previous physicians in [facility].  Plan  -Patient to have TSH, CMP, and magnesium levels drawn for next visit.    2. Weakness of both lower extremities  3. Pain in both lower extremities  4. Spinal stenosis of lumbar region with neurogenic claudication  Patient states having a 3-year history of lower extremity pain.  Imaging performed at hospital revealed anterolisthesis and mild spinal stenosis.  Patient states that she has been on 20 mg of Evoxac U 8 hours previously.  No medical records available at this time to verify.    Our plan is to prescribe patient enough medication until next Friday when she can be " seen in the pain clinic.  Patient provided a urine drug sample today, this will be read next Friday at which time patient may be given more pain medication and referred to another pain clinic for possible nonnarcotic interventions.    In prescribing controlled substances to this patient, I certify that I have obtained and reviewed the medical history of Christy Najera. I have also made a good anyi effort to obtain applicable records from other providers who have treated the patient and no other records are available at this time.     I have conducted a physical exam and documented it. I have reviewed Ms. Najera’s prescription history as maintained by the [place] Prescription Monitoring Program.     I have assessed the patient’s risk for abuse, dependency, and addiction using the validated Opioid Risk Tool available at https://www.mdcalc.com/ggqbrl-imvv-zubo-ort-narcotic-abuse.     Given the above, I believe the benefits of controlled substance therapy outweigh the risks. The reasons for prescribing controlled substances include the patient has a documented allergy to alternatives to controlled substances. Accordingly, I have discussed the risk and benefits, treatment plan, and alternative therapies with the patient.       5. Restless leg  Patient reports a history that appears to be restless leg syndrome, could also have an MAMTA component to it.  Patient will follow up with us in 5 weeks with a CMP, TSH, iron studies, and magnesium.    6. Anemia, unspecified type  Mild anemia, normocytic.  Patient will have iron studies, folate, and vitamin B12 labs performed.    7. Other fatigue  Patient reports having fatigue, this could be secondary to her depression and/or an organic cause.  Current workup looking for organic causes being performed.    8. Depression, unspecified depression type  Patient scored a 9 on her PHQ 9 questionnaire.  Patient is currently on duloxetine.  Patient reports still feeling depressed  secondary to her obesity.  She wishes that she could be able to play more with her grandchildren without being tired.  Plan  -Patient referred to weight loss management, diet discussed with patient.    9. Obstructive sleep apnea syndrome  Patient has a history of MAMTA.  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  She is unable to qualify for any one to 2 more years.    10. Hyperglycemia  Patient was hyperglycemic on 1 previous lab.  Plan  -A1c to be performed by next visit.    11. Class 3 severe obesity with serious comorbidity and body mass index (BMI) greater than or equal to 70 in adult, unspecified obesity type (HCC)  Patient is severely obese.  She states that this is a great cause for her depression.  She wishes to try dieting and exercise, at this point she wished to be referred to weight loss management program.  Plan  [facility]  For [facility] management and diet intervention.      Followup: Return in 11 days (on 1/4/2019), or if symptoms worsen or fail to improve, for please schedule with Dr Tirado on Jan 4 for pain clinic and with me at the end of january. Thank you.    This note was created using voice recognition software. There may be unintended errors in spelling, grammar or content.    Signed by: Ramez Raya M.D.

## 2018-12-26 RX ORDER — LEVOTHYROXINE SODIUM 175 UG/1
175 TABLET ORAL
Qty: 90 TAB | Refills: 1 | Status: SHIPPED | OUTPATIENT
Start: 2018-12-26

## 2018-12-26 RX ORDER — DULOXETIN HYDROCHLORIDE 60 MG/1
60 CAPSULE, DELAYED RELEASE ORAL DAILY
Qty: 90 CAP | Refills: 1 | Status: SHIPPED | OUTPATIENT
Start: 2018-12-26

## 2018-12-26 RX ORDER — DILTIAZEM HYDROCHLORIDE 60 MG/1
60 TABLET, FILM COATED ORAL 2 TIMES DAILY
Qty: 120 TAB | Refills: 1 | Status: SHIPPED | OUTPATIENT
Start: 2018-12-26

## 2018-12-26 RX ORDER — ONDANSETRON 4 MG/1
4 TABLET, ORALLY DISINTEGRATING ORAL EVERY 8 HOURS PRN
Qty: 10 TAB | Refills: 0 | Status: SHIPPED | OUTPATIENT
Start: 2018-12-26 | End: 2018-12-31

## 2018-12-26 RX ORDER — CARISOPRODOL 350 MG/1
350 TABLET ORAL EVERY 8 HOURS PRN
Qty: 30 TAB | Refills: 0
Start: 2018-12-26 | End: 2019-01-25

## 2018-12-26 NOTE — TELEPHONE ENCOUNTER
Was the patient seen in the last year in this department? Yes    Does patient have an active prescription for medications requested? No     Received Request Via: Pharmacy    Patient also requesting for Zolpidem

## 2018-12-31 ENCOUNTER — HOSPITAL ENCOUNTER (INPATIENT)
Facility: MEDICAL CENTER | Age: 48
LOS: 3 days | DRG: 552 | End: 2019-01-03
Attending: EMERGENCY MEDICINE | Admitting: HOSPITALIST

## 2018-12-31 ENCOUNTER — APPOINTMENT (OUTPATIENT)
Dept: RADIOLOGY | Facility: MEDICAL CENTER | Age: 48
DRG: 552 | End: 2018-12-31
Attending: EMERGENCY MEDICINE

## 2018-12-31 DIAGNOSIS — M48.062 SPINAL STENOSIS OF LUMBAR REGION WITH NEUROGENIC CLAUDICATION: ICD-10-CM

## 2018-12-31 DIAGNOSIS — R29.898 WEAKNESS OF BOTH LOWER EXTREMITIES: ICD-10-CM

## 2018-12-31 DIAGNOSIS — M79.604 PAIN IN BOTH LOWER EXTREMITIES: ICD-10-CM

## 2018-12-31 DIAGNOSIS — R29.898 WEAKNESS OF BOTH LOWER EXTREMITIES: Primary | ICD-10-CM

## 2018-12-31 DIAGNOSIS — M79.605 PAIN IN BOTH LOWER EXTREMITIES: ICD-10-CM
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
## 2018-12-31 DIAGNOSIS — M54.10 RADICULAR SYNDROME OF RIGHT LEG: ICD-10-CM
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
## 2018-12-31 DIAGNOSIS — M54.41 ACUTE MIDLINE LOW BACK PAIN WITH RIGHT-SIDED SCIATICA: ICD-10-CM

## 2018-12-31 DIAGNOSIS — E66.01 MORBID OBESITY DUE TO EXCESS CALORIES (HCC): ICD-10-CM

## 2018-12-31 DIAGNOSIS — M25.561 ACUTE PAIN OF RIGHT KNEE: ICD-10-CM

## 2018-12-31 LAB
ANION GAP SERPL CALC-SCNC: 9 MMOL/L (ref 0–11.9)
APTT PPP: 31.6 SEC (ref 24.7–36)
BASOPHILS # BLD AUTO: 0.8 % (ref 0–1.8)
BASOPHILS # BLD: 0.06 K/UL (ref 0–0.12)
BUN SERPL-MCNC: 14 MG/DL (ref 8–22)
CALCIUM SERPL-MCNC: 10 MG/DL (ref 8.5–10.5)
CHLORIDE SERPL-SCNC: 102 MMOL/L (ref 96–112)
CO2 SERPL-SCNC: 27 MMOL/L (ref 20–33)
CREAT SERPL-MCNC: 0.8 MG/DL (ref 0.5–1.4)
EOSINOPHIL # BLD AUTO: 0.07 K/UL (ref 0–0.51)
EOSINOPHIL NFR BLD: 0.9 % (ref 0–6.9)
ERYTHROCYTE [DISTWIDTH] IN BLOOD BY AUTOMATED COUNT: 42.5 FL (ref 35.9–50)
GLUCOSE SERPL-MCNC: 99 MG/DL (ref 65–99)
HCT VFR BLD AUTO: 42.2 % (ref 37–47)
HGB BLD-MCNC: 14 G/DL (ref 12–16)
IMM GRANULOCYTES # BLD AUTO: 0.02 K/UL (ref 0–0.11)
IMM GRANULOCYTES NFR BLD AUTO: 0.3 % (ref 0–0.9)
INR PPP: 1.63 (ref 0.87–1.13)
LYMPHOCYTES # BLD AUTO: 1.79 K/UL (ref 1–4.8)
LYMPHOCYTES NFR BLD: 23.1 % (ref 22–41)
MAGNESIUM SERPL-MCNC: 1.6 MG/DL (ref 1.5–2.5)
MCH RBC QN AUTO: 30 PG (ref 27–33)
MCHC RBC AUTO-ENTMCNC: 33.2 G/DL (ref 33.6–35)
MCV RBC AUTO: 90.6 FL (ref 81.4–97.8)
MONOCYTES # BLD AUTO: 0.53 K/UL (ref 0–0.85)
MONOCYTES NFR BLD AUTO: 6.8 % (ref 0–13.4)
NEUTROPHILS # BLD AUTO: 5.28 K/UL (ref 2–7.15)
NEUTROPHILS NFR BLD: 68.1 % (ref 44–72)
NRBC # BLD AUTO: 0 K/UL
NRBC BLD-RTO: 0 /100 WBC
PHOSPHATE SERPL-MCNC: 2.8 MG/DL (ref 2.5–4.5)
PLATELET # BLD AUTO: 301 K/UL (ref 164–446)
PMV BLD AUTO: 9.8 FL (ref 9–12.9)
POTASSIUM SERPL-SCNC: 4 MMOL/L (ref 3.6–5.5)
PROTHROMBIN TIME: 19.4 SEC (ref 12–14.6)
RBC # BLD AUTO: 4.66 M/UL (ref 4.2–5.4)
SODIUM SERPL-SCNC: 138 MMOL/L (ref 135–145)
T3FREE SERPL-MCNC: 2.94 PG/ML (ref 2.4–4.2)
T4 FREE SERPL-MCNC: 1.04 NG/DL (ref 0.53–1.43)
TSH SERPL DL<=0.005 MIU/L-ACNC: 11.63 UIU/ML (ref 0.38–5.33)
WBC # BLD AUTO: 7.8 K/UL (ref 4.8–10.8)

## 2018-12-31 PROCEDURE — 84443 ASSAY THYROID STIM HORMONE: CPT

## 2018-12-31 PROCEDURE — 85610 PROTHROMBIN TIME: CPT

## 2018-12-31 PROCEDURE — 84439 ASSAY OF FREE THYROXINE: CPT

## 2018-12-31 PROCEDURE — 73502 X-RAY EXAM HIP UNI 2-3 VIEWS: CPT | Mod: RT

## 2018-12-31 PROCEDURE — 85730 THROMBOPLASTIN TIME PARTIAL: CPT

## 2018-12-31 PROCEDURE — 36415 COLL VENOUS BLD VENIPUNCTURE: CPT

## 2018-12-31 PROCEDURE — 96374 THER/PROPH/DIAG INJ IV PUSH: CPT

## 2018-12-31 PROCEDURE — 700111 HCHG RX REV CODE 636 W/ 250 OVERRIDE (IP): Performed by: EMERGENCY MEDICINE

## 2018-12-31 PROCEDURE — 84100 ASSAY OF PHOSPHORUS: CPT

## 2018-12-31 PROCEDURE — A9270 NON-COVERED ITEM OR SERVICE: HCPCS | Performed by: STUDENT IN AN ORGANIZED HEALTH CARE EDUCATION/TRAINING PROGRAM

## 2018-12-31 PROCEDURE — 700102 HCHG RX REV CODE 250 W/ 637 OVERRIDE(OP): Performed by: STUDENT IN AN ORGANIZED HEALTH CARE EDUCATION/TRAINING PROGRAM

## 2018-12-31 PROCEDURE — 73564 X-RAY EXAM KNEE 4 OR MORE: CPT | Mod: RT

## 2018-12-31 PROCEDURE — 94760 N-INVAS EAR/PLS OXIMETRY 1: CPT

## 2018-12-31 PROCEDURE — 83735 ASSAY OF MAGNESIUM: CPT

## 2018-12-31 PROCEDURE — 770006 HCHG ROOM/CARE - MED/SURG/GYN SEMI*

## 2018-12-31 PROCEDURE — 99285 EMERGENCY DEPT VISIT HI MDM: CPT

## 2018-12-31 PROCEDURE — 96376 TX/PRO/DX INJ SAME DRUG ADON: CPT

## 2018-12-31 PROCEDURE — 84481 FREE ASSAY (FT-3): CPT

## 2018-12-31 PROCEDURE — 72100 X-RAY EXAM L-S SPINE 2/3 VWS: CPT

## 2018-12-31 PROCEDURE — 80048 BASIC METABOLIC PNL TOTAL CA: CPT

## 2018-12-31 PROCEDURE — 700111 HCHG RX REV CODE 636 W/ 250 OVERRIDE (IP)

## 2018-12-31 PROCEDURE — 85025 COMPLETE CBC W/AUTO DIFF WBC: CPT

## 2018-12-31 RX ORDER — LIOTHYRONINE SODIUM 5 UG/1
5 TABLET ORAL DAILY
Status: DISCONTINUED | OUTPATIENT
Start: 2019-01-01 | End: 2019-01-03 | Stop reason: HOSPADM

## 2018-12-31 RX ORDER — LIOTHYRONINE SODIUM 5 UG/1
5 TABLET ORAL DAILY
COMMUNITY
End: 2019-01-03 | Stop reason: SDUPTHER

## 2018-12-31 RX ORDER — DULOXETIN HYDROCHLORIDE 60 MG/1
60 CAPSULE, DELAYED RELEASE ORAL DAILY
Status: DISCONTINUED | OUTPATIENT
Start: 2019-01-01 | End: 2019-01-03 | Stop reason: HOSPADM

## 2018-12-31 RX ORDER — HYDROMORPHONE HYDROCHLORIDE 1 MG/ML
1 INJECTION, SOLUTION INTRAMUSCULAR; INTRAVENOUS; SUBCUTANEOUS ONCE
Status: COMPLETED | OUTPATIENT
Start: 2018-12-31 | End: 2018-12-31

## 2018-12-31 RX ORDER — DILTIAZEM HYDROCHLORIDE 60 MG/1
60 TABLET, FILM COATED ORAL 2 TIMES DAILY
Status: DISCONTINUED | OUTPATIENT
Start: 2018-12-31 | End: 2019-01-03 | Stop reason: HOSPADM

## 2018-12-31 RX ORDER — POLYETHYLENE GLYCOL 3350 17 G/17G
1 POWDER, FOR SOLUTION ORAL
Status: DISCONTINUED | OUTPATIENT
Start: 2018-12-31 | End: 2019-01-03 | Stop reason: HOSPADM

## 2018-12-31 RX ORDER — BISACODYL 10 MG
10 SUPPOSITORY, RECTAL RECTAL
Status: DISCONTINUED | OUTPATIENT
Start: 2018-12-31 | End: 2019-01-03 | Stop reason: HOSPADM

## 2018-12-31 RX ORDER — OXYCODONE HYDROCHLORIDE 5 MG/1
15 TABLET ORAL EVERY 8 HOURS PRN
Status: DISCONTINUED | OUTPATIENT
Start: 2018-12-31 | End: 2019-01-03 | Stop reason: HOSPADM

## 2018-12-31 RX ORDER — LORAZEPAM 2 MG/ML
1 INJECTION INTRAMUSCULAR ONCE
Status: ACTIVE | OUTPATIENT
Start: 2018-12-31 | End: 2019-01-01

## 2018-12-31 RX ORDER — LEVOTHYROXINE SODIUM 0.03 MG/1
175 TABLET ORAL
Status: DISCONTINUED | OUTPATIENT
Start: 2019-01-01 | End: 2019-01-03 | Stop reason: HOSPADM

## 2018-12-31 RX ORDER — HYDRALAZINE HYDROCHLORIDE 20 MG/ML
10 INJECTION INTRAMUSCULAR; INTRAVENOUS EVERY 4 HOURS PRN
Status: DISCONTINUED | OUTPATIENT
Start: 2018-12-31 | End: 2019-01-03 | Stop reason: HOSPADM

## 2018-12-31 RX ORDER — AMOXICILLIN 250 MG
2 CAPSULE ORAL 2 TIMES DAILY
Status: DISCONTINUED | OUTPATIENT
Start: 2018-12-31 | End: 2019-01-03 | Stop reason: HOSPADM

## 2018-12-31 RX ADMIN — HYDROMORPHONE HYDROCHLORIDE 1 MG: 1 INJECTION, SOLUTION INTRAMUSCULAR; INTRAVENOUS; SUBCUTANEOUS at 20:36

## 2018-12-31 RX ADMIN — DILTIAZEM HYDROCHLORIDE 60 MG: 60 TABLET, FILM COATED ORAL at 23:13

## 2018-12-31 RX ADMIN — HYDROMORPHONE HYDROCHLORIDE 1 MG: 1 INJECTION, SOLUTION INTRAMUSCULAR; INTRAVENOUS; SUBCUTANEOUS at 18:39

## 2018-12-31 RX ADMIN — OXYCODONE HYDROCHLORIDE 15 MG: 5 TABLET ORAL at 22:54

## 2018-12-31 ASSESSMENT — PAIN SCALES - GENERAL
PAINLEVEL_OUTOF10: 6
PAINLEVEL_OUTOF10: 8

## 2018-12-31 ASSESSMENT — LIFESTYLE VARIABLES: DO YOU DRINK ALCOHOL: NO

## 2019-01-01 ENCOUNTER — APPOINTMENT (OUTPATIENT)
Dept: RADIOLOGY | Facility: MEDICAL CENTER | Age: 49
DRG: 552 | End: 2019-01-01
Attending: STUDENT IN AN ORGANIZED HEALTH CARE EDUCATION/TRAINING PROGRAM

## 2019-01-01 PROBLEM — R29.898 WEAKNESS OF BOTH LOWER EXTREMITIES: Status: ACTIVE | Noted: 2019-01-01

## 2019-01-01 PROBLEM — E66.01 MORBID OBESITY DUE TO EXCESS CALORIES (HCC): Status: ACTIVE | Noted: 2019-01-01
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
## 2019-01-01 LAB
AMPHET UR QL SCN: NEGATIVE
BARBITURATES UR QL SCN: NEGATIVE
BENZODIAZ UR QL SCN: NEGATIVE
BZE UR QL SCN: NEGATIVE
CANNABINOIDS UR QL SCN: NEGATIVE
CK SERPL-CCNC: 69 U/L (ref 0–154)
METHADONE UR QL SCN: NEGATIVE
OPIATES UR QL SCN: POSITIVE
OXYCODONE UR QL SCN: POSITIVE
PCP UR QL SCN: NEGATIVE
PROPOXYPH UR QL SCN: NEGATIVE

## 2019-01-01 PROCEDURE — 93970 EXTREMITY STUDY: CPT

## 2019-01-01 PROCEDURE — 700102 HCHG RX REV CODE 250 W/ 637 OVERRIDE(OP): Performed by: INTERNAL MEDICINE

## 2019-01-01 PROCEDURE — 306587 SLEEVE,VASO CALF BARIATRIC: Performed by: HOSPITALIST

## 2019-01-01 PROCEDURE — 306589 SLEEVE,VASO CALF LARGE: Performed by: HOSPITALIST

## 2019-01-01 PROCEDURE — A9270 NON-COVERED ITEM OR SERVICE: HCPCS | Performed by: STUDENT IN AN ORGANIZED HEALTH CARE EDUCATION/TRAINING PROGRAM

## 2019-01-01 PROCEDURE — 82550 ASSAY OF CK (CPK): CPT

## 2019-01-01 PROCEDURE — 36415 COLL VENOUS BLD VENIPUNCTURE: CPT

## 2019-01-01 PROCEDURE — A9270 NON-COVERED ITEM OR SERVICE: HCPCS | Performed by: INTERNAL MEDICINE

## 2019-01-01 PROCEDURE — 700102 HCHG RX REV CODE 250 W/ 637 OVERRIDE(OP): Performed by: STUDENT IN AN ORGANIZED HEALTH CARE EDUCATION/TRAINING PROGRAM

## 2019-01-01 PROCEDURE — 97161 PT EVAL LOW COMPLEX 20 MIN: CPT

## 2019-01-01 PROCEDURE — 99223 1ST HOSP IP/OBS HIGH 75: CPT | Mod: GC | Performed by: HOSPITALIST

## 2019-01-01 PROCEDURE — 80307 DRUG TEST PRSMV CHEM ANLYZR: CPT

## 2019-01-01 PROCEDURE — 93970 EXTREMITY STUDY: CPT | Mod: 26 | Performed by: SURGERY

## 2019-01-01 PROCEDURE — 97165 OT EVAL LOW COMPLEX 30 MIN: CPT

## 2019-01-01 PROCEDURE — 302135 SEQUENTIAL COMPRESSION MACHINE: Performed by: HOSPITALIST

## 2019-01-01 PROCEDURE — 770006 HCHG ROOM/CARE - MED/SURG/GYN SEMI*

## 2019-01-01 RX ORDER — CARISOPRODOL 350 MG/1
350 TABLET ORAL 3 TIMES DAILY PRN
Status: DISCONTINUED | OUTPATIENT
Start: 2019-01-01 | End: 2019-01-03

## 2019-01-01 RX ORDER — CARISOPRODOL 350 MG/1
350 TABLET ORAL 3 TIMES DAILY
Status: DISCONTINUED | OUTPATIENT
Start: 2019-01-01 | End: 2019-01-01

## 2019-01-01 RX ORDER — CARISOPRODOL 350 MG/1
350 TABLET ORAL 3 TIMES DAILY PRN
COMMUNITY
End: 2019-01-03 | Stop reason: SDUPTHER

## 2019-01-01 RX ORDER — CARISOPRODOL 350 MG/1
300 TABLET ORAL 3 TIMES DAILY PRN
Status: DISCONTINUED | OUTPATIENT
Start: 2019-01-01 | End: 2019-01-01

## 2019-01-01 RX ADMIN — OXYCODONE HYDROCHLORIDE 15 MG: 5 TABLET ORAL at 06:40

## 2019-01-01 RX ADMIN — DULOXETINE HYDROCHLORIDE 60 MG: 60 CAPSULE, DELAYED RELEASE ORAL at 04:28

## 2019-01-01 RX ADMIN — MAGNESIUM GLUCONATE 500 MG ORAL TABLET 400 MG: 500 TABLET ORAL at 04:28

## 2019-01-01 RX ADMIN — CARISOPRODOL 350 MG: 350 TABLET ORAL at 22:32

## 2019-01-01 RX ADMIN — RIVAROXABAN 20 MG: 20 TABLET, FILM COATED ORAL at 04:28

## 2019-01-01 RX ADMIN — DILTIAZEM HYDROCHLORIDE 60 MG: 60 TABLET, FILM COATED ORAL at 04:29

## 2019-01-01 RX ADMIN — CARISOPRODOL 350 MG: 350 TABLET ORAL at 16:21

## 2019-01-01 RX ADMIN — CARISOPRODOL 350 MG: 350 TABLET ORAL at 04:29

## 2019-01-01 RX ADMIN — OXYCODONE HYDROCHLORIDE 15 MG: 5 TABLET ORAL at 14:42

## 2019-01-01 RX ADMIN — MAGNESIUM GLUCONATE 500 MG ORAL TABLET 400 MG: 500 TABLET ORAL at 17:22

## 2019-01-01 RX ADMIN — STANDARDIZED SENNA CONCENTRATE AND DOCUSATE SODIUM 2 TABLET: 8.6; 5 TABLET, FILM COATED ORAL at 04:29

## 2019-01-01 RX ADMIN — DILTIAZEM HYDROCHLORIDE 60 MG: 60 TABLET, FILM COATED ORAL at 17:22

## 2019-01-01 RX ADMIN — OXYCODONE HYDROCHLORIDE 15 MG: 5 TABLET ORAL at 22:32

## 2019-01-01 RX ADMIN — LIOTHYRONINE SODIUM 5 MCG: 5 TABLET ORAL at 04:28

## 2019-01-01 RX ADMIN — STANDARDIZED SENNA CONCENTRATE AND DOCUSATE SODIUM 2 TABLET: 8.6; 5 TABLET, FILM COATED ORAL at 17:23

## 2019-01-01 RX ADMIN — LEVOTHYROXINE SODIUM 175 MCG: 175 TABLET ORAL at 04:29

## 2019-01-01 ASSESSMENT — ENCOUNTER SYMPTOMS
FEVER: 0
RESPIRATORY NEGATIVE: 1
EYE REDNESS: 0
NEUROLOGICAL NEGATIVE: 1
CONSTITUTIONAL NEGATIVE: 1
SORE THROAT: 0
CHILLS: 0
SENSORY CHANGE: 0
CONSTIPATION: 0
FOCAL WEAKNESS: 0
FALLS: 1
VOMITING: 0
TREMORS: 0
SEIZURES: 0
CARDIOVASCULAR NEGATIVE: 1
EYE PAIN: 0
EYES NEGATIVE: 1
SPUTUM PRODUCTION: 0
ABDOMINAL PAIN: 0
HEADACHES: 0
DIARRHEA: 0
PSYCHIATRIC NEGATIVE: 1
LOSS OF CONSCIOUSNESS: 0
SPEECH CHANGE: 0
SHORTNESS OF BREATH: 1
POLYDIPSIA: 0
DIZZINESS: 0
NECK PAIN: 0
DEPRESSION: 0
MYALGIAS: 1
SHORTNESS OF BREATH: 0
NAUSEA: 0
COUGH: 0
BRUISES/BLEEDS EASILY: 0
BACK PAIN: 1
PALPITATIONS: 0
DOUBLE VISION: 0
GASTROINTESTINAL NEGATIVE: 1
SINUS PAIN: 0
BLURRED VISION: 0
WEAKNESS: 0
FLANK PAIN: 0

## 2019-01-01 ASSESSMENT — PAIN SCALES - GENERAL
PAINLEVEL_OUTOF10: 8
PAINLEVEL_OUTOF10: 6
PAINLEVEL_OUTOF10: 6
PAINLEVEL_OUTOF10: 7
PAINLEVEL_OUTOF10: 6

## 2019-01-01 ASSESSMENT — COGNITIVE AND FUNCTIONAL STATUS - GENERAL
DRESSING REGULAR LOWER BODY CLOTHING: A LITTLE
HELP NEEDED FOR BATHING: A LITTLE
DRESSING REGULAR LOWER BODY CLOTHING: A LITTLE
SUGGESTED CMS G CODE MODIFIER MOBILITY: CJ
MOVING FROM LYING ON BACK TO SITTING ON SIDE OF FLAT BED: A LITTLE
DRESSING REGULAR UPPER BODY CLOTHING: A LITTLE
SUGGESTED CMS G CODE MODIFIER DAILY ACTIVITY: CJ
DRESSING REGULAR UPPER BODY CLOTHING: A LITTLE
MOBILITY SCORE: 16
CLIMB 3 TO 5 STEPS WITH RAILING: A LOT
TURNING FROM BACK TO SIDE WHILE IN FLAT BAD: A LITTLE
STANDING UP FROM CHAIR USING ARMS: A LITTLE
MOVING TO AND FROM BED TO CHAIR: A LITTLE
MOBILITY SCORE: 20
TOILETING: A LITTLE
DAILY ACTIVITIY SCORE: 20
DAILY ACTIVITIY SCORE: 20
TOILETING: A LITTLE
WALKING IN HOSPITAL ROOM: A LOT
HELP NEEDED FOR BATHING: A LITTLE
MOVING FROM LYING ON BACK TO SITTING ON SIDE OF FLAT BED: A LITTLE
STANDING UP FROM CHAIR USING ARMS: A LITTLE
WALKING IN HOSPITAL ROOM: A LITTLE
SUGGESTED CMS G CODE MODIFIER DAILY ACTIVITY: CJ
CLIMB 3 TO 5 STEPS WITH RAILING: A LITTLE
SUGGESTED CMS G CODE MODIFIER MOBILITY: CK

## 2019-01-01 ASSESSMENT — GAIT ASSESSMENTS
DEVIATION: ANTALGIC;STEP TO;DECREASED HEEL STRIKE;DECREASED TOE OFF;OTHER (COMMENT)
GAIT LEVEL OF ASSIST: CONTACT GUARD ASSIST
ASSISTIVE DEVICE: FRONT WHEEL WALKER
DISTANCE (FEET): 50

## 2019-01-01 ASSESSMENT — LIFESTYLE VARIABLES
ALCOHOL_USE: NO
SUBSTANCE_ABUSE: 0
EVER_SMOKED: NEVER

## 2019-01-01 ASSESSMENT — ACTIVITIES OF DAILY LIVING (ADL): TOILETING: INDEPENDENT

## 2019-01-01 NOTE — ASSESSMENT & PLAN NOTE
Likely secondary to radiculopathy vs arthritic pain vs pain from her weight.   - MRI lumbar spine: Multilevel multifactorial degenerative changes without appreciable interval change, Areas of MILD central canal and neural foraminal narrowing   - Patient was worked up by neurology for this during last visit and was not a surgical candidate due to weight  - Continue 15mg of Oxycodone.   - Inpatient PT and home PT per their recommendations. She might not get home PT as she does not have insurance and need to find a way to treat her back pain in outpatient setting.

## 2019-01-01 NOTE — ED NOTES
Spoke with MRI Tech Juan Jose - patient is over the weight capacity for the MRI Scanner machine. Discussed with ERP Dr. Darby.

## 2019-01-01 NOTE — SENIOR ADMIT NOTE
" Senior Admission Note    In summary: Christy Najera is a 48 y.o. female with past medical history of hypothyroidism, spinal stenosis, peripheral neuropathy, DVT/PE on Xarelto presented to the ER after having a ground level fall at home. She states she was feeling weak and unwell yesterday. This morning, she got up out of bed took a few steps then her knees gave out and she fell backwards, injuring her lower back. Denies head trauma. She subsequently was able to get up after the fall. She tried taking some outpatient oxycodone with no relief in pain therefore decided to seek medical attention    She notes the location of her back pain is \"L4-L5\" although pointing to her sacrum region. Recently noted bruising on her lower extremities. Reports she falls a lot because her knees give out    Denies saddle anesthesia. Denies bowel/urinary incontinence. Did not lose consciousness after her fall.  No recent diarrheal illness. Denies fever, chills, drug use.     She states she was supposed to follow-up with her primary care provider on Friday but no appointment was scheduled and had no pain meds to control her pain.       Reports compliance with anticoagulation.       She was seen earlier this month with similar complaints but on her left side. She recently moved from [place], is following with primary care for pain management. She is apparently to follow-up with neurosurgery as outpatient along with home health outpatient. She also states she is working on getting physical therapy as well. She was seen by Dr. Ellis at that time recommended weight loss and outpatient pain management referral along with PT/OT. She was to follow-up with them as outpatient      Pertinent physical exam findings:    HEENT: Nomocephalic, atraumatic. PERRLA. EOMI.   Cardiovascular: Normal heart rate, rhythm. Regular rhythm  Lungs: Respiratory effort is normal. Normal breath sounds  Abdomen: Bowel sounds normal, Soft, No tenderness  Neurologic: " Alert & oriented x 3. Reports no sensation on her right lower extremity then says she can feel me moving her leg. Motor exam inconsistent; right lower extremity 0/5, left 4/5 however was able to move herself using her lower extremities to edge of bed.   2 + peripheral pulses  Other systems also examined, grossly normal.     Pertinent studies:   MRI from 12/10/2018: Mild spinal stenosis at the level of L4-L5  Hemodynamically stable. No SIRS. Afebrile  Electrolytes normal, Mag 1.6    Assessment and plan in summary:    1. Lower back pain  - now with associated right sided numbness and weakness. Exam inconsistent. H/o peripheral neuropathy.   - Unfortunately due to her morbid obesity, unable to get MRI until Wednesday as she needs the outpatient MRI machine which will not be available until then  - H/o TSH 28 during previous admission; will re-check thyroid labs  - CPK pending  - PT/OT in AM  - Pain management  - Will get UDS; denies drug use  - Rule out DVT with ultrasound      2. Hypothyroidism  - TSH 23.05 during previous admission. Will re-check      3. Hypomagnesemia  - Mag 1.6; replaced. Monitor      4. H/o Paroxysmal Atrial Fibrillation  - Currently sinus rhythm at the time of admission. Normal rate  - Continue outpatient medications  - On anticoagulation; continue          For full plan, please see Intern note for details   Kinjal Lopez M.D.  PGY 2

## 2019-01-01 NOTE — ED TRIAGE NOTES
Pt to triage .  Chief Complaint   Patient presents with   • Leg Swelling     left lower leg pain/swelling/redness    hst of DVT and currently taking xarelto

## 2019-01-01 NOTE — ED NOTES
Patient passed bedside dysphagia screening.    Medicated per orders for 8/10 pain to low back/R knee.

## 2019-01-01 NOTE — PROGRESS NOTES
Pt is AAOx4  Pt reports a 6/10 pain level  Medicated per MAR  VS WNL  POC discussed  All needs met at this time  Bed in low position  Call light within reach  Rounding in place

## 2019-01-01 NOTE — ED NOTES
Med rec updated and complete.  Allergies reviewed.  Pt reports taking soma and Ambien .  Unable to verify prescriptions. Medications not added to med rec at this  Time.  Pt denies antibiotic  use in  Last 30 days at home.

## 2019-01-01 NOTE — ED NOTES
"Report received from PETER Bond.    Patient returned from x-ray via gurney accompanied by x-ray tech.    Patient resting on gurney. No acute distress. States that pain has improved to 6/10 since receiving medication per orders - per patient this is a tolerable pain level. States that she feels like her leg is going numb - states that this has happened multiple times in the past and always resolves \"with just time.\" Asking for water - educated regarding NPO status, verbalized understanding. Denies further needs at this time. Call bell within reach. Updated regarding plan of care.  "

## 2019-01-01 NOTE — H&P
"      Internal Medicine Admitting History and Physical    Note Author: Rhiannon Jesus M.D.       Name Christy Najera 1970   Age/Sex 48 y.o. female   MRN [id number]   Code Status FULL     After 5PM or if no immediate response to page, please call for cross-coverage  Attending/Team: Dr. Munoz/ AMANDA Campa See Patient List for primary contact information  Call [phone] to page    1st Call - Day Intern (R1):   Dr. Taylor 2nd Call - Day Sr. Resident (R2/R3):   Dr. Hurley       Chief Complaint:  Leg pain/weakness    HPI:  Patient is a 48 year old female with a history of chronic low back pain and mild spinal stenosis at L4-5 who is presenting after a fall where she states that her knees gave out. The patient stated that she was walking to her kitchen when her legs just gave out underneath her. She was not feeling dizzy. She remembers the whole fall. Denies any head trauma, confusion, bowel/bladder incontinence. She denies any chest pain/palpitations prior to the fall. The patient stated that it has happened before in the past, but this time she fell backwards, which has never happened before. She stated that she has been having weakness in her bilateral legs as well. This weakness has been present for a while and is associated with pain.     The patient has a history of chronic low-back pain. When asked where the pain is on her back, she stated \"L4-L5\" and pointed to the sacral region of the spine. She also stated that she had a lot of pain in her right knee. The pain starts in her back and radiated down to her knee. She stated that it is 10/10 pain and is throbbing. It is worse when she is moving. It is better when she is laying on her right side and with pain medication. The patient was supposed to see her PCP on Friday for a refill of pain medication. However,, she was unable to see her PCP at the time due to a scheduling error. She stated that she was getting 15mg of Oxycodone and it was not " helping her pain. She is also allergic to Tylenol, Toradol, Tramadol, Gabapentin, Lyrica. The patient also stated that she has been having numbness mostly in the right leg.     The patient was last seen at [facility] on Dec 10, 2018 for similar complaints, but on her left leg. Dr. Ellis worked her up for left leg pain that was thought to be from sciatica. Dr. Ellis at the time did not believe she was a surgical candidate due to her significant comorbidities and BMI. He said that her leg pain may be unrelated to her back pain and may be related to a stroke or previous surgeries. On this admission, the patient's left leg is doing fine with some weakness, but the right leg is now having difficulties.     Patient was given pain medication in ED which helped alleviate some of her pain. She was given x-ray of knee, hip, and lumbar spine which did not show any acute abnormality. The patient needed an MRI but could not fit in machine. She is set up to use outpatient MRI, but it is not available till Wednesday.     Review of Systems   Constitutional: Negative for chills and fever.   HENT: Negative for congestion, sinus pain and sore throat.    Eyes: Negative for blurred vision and double vision.   Respiratory: Positive for shortness of breath. Negative for cough.         Baseline shortness of breath   Cardiovascular: Negative for chest pain and palpitations.   Gastrointestinal: Negative for abdominal pain, constipation, diarrhea, nausea and vomiting.   Genitourinary: Negative for dysuria and urgency.   Musculoskeletal: Positive for back pain and falls.   Skin: Negative for itching and rash.   Neurological: Negative for dizziness, sensory change, speech change, focal weakness, loss of consciousness and headaches.   Endo/Heme/Allergies: Negative for environmental allergies and polydipsia.   Psychiatric/Behavioral: Negative for substance abuse and suicidal ideas.     Past Medical History:   Past Medical History:   Diagnosis Date    • Congestive heart failure (HCC)    • Hypertension    • Neuropathy (HCC)    • Spinal stenosis    • Stroke (HCC)      Past Surgical History:  Past Surgical History:   Procedure Laterality Date   • APPENDECTOMY     • CHOLECYSTECTOMY     • GYN SURGERY      partial hysterectomy   • OPEN REDUCTION       Current Outpatient Medications:  Home Medications     Reviewed by Ashlee Sotelo (Pharmacy Tech) on 12/31/18 at 2201  Med List Status: Complete   Medication Last Dose Status   DILTIAZem (CARDIZEM) 60 MG Tab 12/31/2018 Active   DULoxetine (CYMBALTA) 60 MG Cap DR Particles delayed-release capsule 12/31/2018 Active   levothyroxine (SYNTHROID) 175 MCG Tab 12/31/2018 Active   oxycodone (OXY-IR) 15 MG immediate release tablet 12/31/2018 Active   rivaroxaban (XARELTO) 20 MG Tab tablet 12/31/2018 Active              Medication Allergy/Sensitivities:  Allergies   Allergen Reactions   • Cillins [Penicillins]      swollen   • Gabapentin Hives   • Lyrica Hives   • Phenergan [Promethazine]    • Toradol      welts   • Tramadol      welts   • Tylenol      welts     Family History:  Family History   Problem Relation Age of Onset   • Cancer Mother    • Heart Disease Father    • Heart Disease Brother      Social History:  Social History     Social History   • Marital status:      Spouse name: N/A   • Number of children: N/A   • Years of education: N/A     Occupational History   • Not on file.     Social History Main Topics   • Smoking status: Never Smoker   • Smokeless tobacco: Never Used   • Alcohol use No   • Drug use: No   • Sexual activity: Not Currently     Other Topics Concern   • Not on file     Social History Narrative   • No narrative on file     Living situation: Lives in [place], [place].   PCP : Ramez Raya M.D.      Physical Exam     Vitals:    12/31/18 2100 12/31/18 2130 12/31/18 2200 12/31/18 2300   BP:       Pulse: 83 82 83 78   Resp:       Temp:       TempSrc:       SpO2: 95% 96% 91% 94%   Weight:          Body mass index is 61.16 kg/m².  BP (!) 161/94   Pulse 78   Temp 36.1 °C (97 °F) (Temporal)   Resp 18   Wt (!) 166.7 kg (367 lb 8.1 oz)   SpO2 94%   BMI 61.16 kg/m²   O2 therapy: Pulse Oximetry: 94 %, O2 Delivery: None (Room Air)    Physical Exam   Constitutional: She is oriented to person, place, and time and well-developed, well-nourished, and in no distress. No distress.   HENT:   Head: Normocephalic and atraumatic.   Mouth/Throat: Oropharynx is clear and moist.   Mallampati score 4   Eyes: Pupils are equal, round, and reactive to light. Conjunctivae and EOM are normal. No scleral icterus.   Neck: Normal range of motion. Neck supple.   Cardiovascular: Normal rate, regular rhythm and normal heart sounds.  Exam reveals no gallop and no friction rub.    No murmur heard.  Pulmonary/Chest: Effort normal and breath sounds normal. No respiratory distress.   Breath sounds distant due to body habitus   Abdominal: Soft. Bowel sounds are normal. She exhibits no distension. There is no tenderness.   Genitourinary: Rectal exam shows guaiac negative stool.   Genitourinary Comments: Rectal exam positive for hemorrhoids. Rectal tone normal. No saddle anesthesia.   Musculoskeletal: She exhibits no edema or deformity.   Straight leg test alleviates pain in right knee and back.    Neurological: She is alert and oriented to person, place, and time. She has intact cranial nerves. She displays facial symmetry and normal speech. No cranial nerve deficit. GCS score is 15.   Sensation decreased on right lower extremity to thigh. Sensation intact on right lower extremity. Facial sensation and upper extremity sensation intact bilaterally.     Dorsiflexion and plantar flexion 5/5 on left. Dorsiflexion 2/5 on right and plantar flexion 5/5 on right.     The patient has 4/5 strength on the left leg and 0/5 strength on the right leg. However, when asked to sit at the edge of the bed to test leg reflexes, the patient was able to move her  leg without assistance to get to the edge of the bed.     Reflexes could not be obtained due to patient's body habitus.      Skin: Skin is warm and dry. She is not diaphoretic.   Ecchymosis over lower extremities bilaterally below knees.     Data Review       Old Records Request:   Completed  Current Records review/summary: Completed    Lab Data Review:  Recent Results (from the past 24 hour(s))   CBC WITH DIFFERENTIAL    Collection Time: 12/31/18  5:51 PM   Result Value Ref Range    WBC 7.8 4.8 - 10.8 K/uL    RBC 4.66 4.20 - 5.40 M/uL    Hemoglobin 14.0 12.0 - 16.0 g/dL    Hematocrit 42.2 37.0 - 47.0 %    MCV 90.6 81.4 - 97.8 fL    MCH 30.0 27.0 - 33.0 pg    MCHC 33.2 (L) 33.6 - 35.0 g/dL    RDW 42.5 35.9 - 50.0 fL    Platelet Count 301 164 - 446 K/uL    MPV 9.8 9.0 - 12.9 fL    Neutrophils-Polys 68.10 44.00 - 72.00 %    Lymphocytes 23.10 22.00 - 41.00 %    Monocytes 6.80 0.00 - 13.40 %    Eosinophils 0.90 0.00 - 6.90 %    Basophils 0.80 0.00 - 1.80 %    Immature Granulocytes 0.30 0.00 - 0.90 %    Nucleated RBC 0.00 /100 WBC    Neutrophils (Absolute) 5.28 2.00 - 7.15 K/uL    Lymphs (Absolute) 1.79 1.00 - 4.80 K/uL    Monos (Absolute) 0.53 0.00 - 0.85 K/uL    Eos (Absolute) 0.07 0.00 - 0.51 K/uL    Baso (Absolute) 0.06 0.00 - 0.12 K/uL    Immature Granulocytes (abs) 0.02 0.00 - 0.11 K/uL    NRBC (Absolute) 0.00 K/uL   BASIC METABOLIC PANEL    Collection Time: 12/31/18  5:51 PM   Result Value Ref Range    Sodium 138 135 - 145 mmol/L    Potassium 4.0 3.6 - 5.5 mmol/L    Chloride 102 96 - 112 mmol/L    Co2 27 20 - 33 mmol/L    Glucose 99 65 - 99 mg/dL    Bun 14 8 - 22 mg/dL    Creatinine 0.80 0.50 - 1.40 mg/dL    Calcium 10.0 8.5 - 10.5 mg/dL    Anion Gap 9.0 0.0 - 11.9   APTT    Collection Time: 12/31/18  5:51 PM   Result Value Ref Range    APTT 31.6 24.7 - 36.0 sec   PROTHROMBIN TIME (INR)    Collection Time: 12/31/18  5:51 PM   Result Value Ref Range    PT 19.4 (H) 12.0 - 14.6 sec    INR 1.63 (H) 0.87 - 1.13    ESTIMATED GFR    Collection Time: 12/31/18  5:51 PM   Result Value Ref Range    GFR If African American >60 >60 mL/min/1.73 m 2    GFR If Non African American >60 >60 mL/min/1.73 m 2   MAGNESIUM    Collection Time: 12/31/18  5:51 PM   Result Value Ref Range    Magnesium 1.6 1.5 - 2.5 mg/dL   PHOSPHORUS    Collection Time: 12/31/18  5:51 PM   Result Value Ref Range    Phosphorus 2.8 2.5 - 4.5 mg/dL       Imaging/Procedures Review:    Independant Imaging Review: Completed  DX-HIP-COMPLETE - UNILATERAL 2+ RIGHT   Final Result      1.  No radiographic evidence of acute traumatic injury.      DX-KNEE COMPLETE 4+ RIGHT   Final Result         1. No acute osseous abnormality.      DX-LUMBAR SPINE-2 OR 3 VIEWS   Final Result      1.  There is no acute fracture or malalignment of the lumbar spine.   2.  There is stable grade 1 anterior spondylolisthesis at the L4-5 level.   3.  There is stable multilevel degenerative lumbar spondylosis.      MR-LUMBAR SPINE-W/O    (Results Pending)   US-EXTREMITY VENOUS LOWER BILAT    (Results Pending)            Assessment/Plan     * Pain in both lower extremities- (present on admission)   Assessment & Plan    Likely secondary to radiculopathy vs arthritic pain. Patient was worked up by neurology for this during last visit and was not a surgical candidate. The patient was on 15mg of Oxycodone. Patient is allergic to tylenol, tramadol, lyrica, gabapentin, toradol.     Plan:  - Continue 15mg of Oxycodone  - Continuous pulse ox given patient's sleep apnea and narcotic use.      Hypothyroidism- (present on admission)   Assessment & Plan    Last TSH was elevated. Given that patient's symptoms may be related to thyroid, follow-up thyroid panel.     Plan:  - Continue Synthroid 175 mcg and Liothyronine  - Follow-up TSH, Free T4, and T3     Weakness of both lower extremities   Assessment & Plan    Likely secondary to body habitus vs hypothyroid. Given patient's history and physical exam findings,  not currently concerned for acute spine injury. Patient does have history of spinal stenosis of L4-L5.    Plan:  - MRI on Wednesday to evaluate if spinal stenosis has gotten worse.   - Admit to neuro  - Q4h neuro checks  - Fall precautions.        Obstructive sleep apnea syndrome- (present on admission)   Assessment & Plan    Patient is on Nocturnal oxygen.     Plan:  - Continue Nocturnal oxymetry.   - Continuous pulse ox     Morbid obesity due to excess calories (HCC)   Assessment & Plan    Patient's BMI is 67.43.     Plan:  - Patient may need outpatient bariatric surgery consult to discuss weight loss.      Falls   Assessment & Plan    Likely secondary to weakness and obesity. Fall sounds as if it is mechanical given that patient was not dizzy/lightheaded.     Plan:  - Fall precautions  - Continue to monitor     History of DVT (deep vein thrombosis)- (present on admission)   Assessment & Plan    History of DVT on Xarelto. Occult blood negative.     Plan:  - Continue Xarelto  - Follow up lower extremity dopper         Anticipated Hospital stay:  >2 midnights    Quality Measures  Quality-Core Measures   Reviewed items::  EKG reviewed, Labs reviewed, Medications reviewed and Radiology images reviewed  Cortes catheter::  No Cortes  DVT: on Xarelto.  DVT prophylaxis - mechanical:  SCDs  Ulcer Prophylaxis::  Yes  Assessed for rehabilitation services:  Patient was assess for and/or received rehabilitation services during this hospitalization    PCP: Ramez Raya M.D.

## 2019-01-01 NOTE — ASSESSMENT & PLAN NOTE
- Patient's BMI is 67.43. Counseled for bariatric surgery  - Patient may need outpatient bariatric surgery consult to discuss weight loss.

## 2019-01-01 NOTE — ASSESSMENT & PLAN NOTE
Likely secondary to body habitus vs hypothyroid.   No evidence of acute spine injury.   Has a history of spinal stenosis of L4-L5 based on MRI. MRI during this hospitalizations did not show any change.  - Neuropathy and weakness are improving.  - Continue with PT sessions.

## 2019-01-01 NOTE — ED NOTES
Report given via telephone to Ortho RN Jenn. Patient transported to floor via gurney in stable condition accompanied by transport. All belongings accounted for.

## 2019-01-01 NOTE — PROGRESS NOTES
· 2 RN skin check complete with Lyly GREEN  · Devices in place: None  · All skin is intact, no signs of any skin breakdown  · redness/rash to bilateral knees  · bruising to abdomen  · Sacrum red, blanching  · The following interventions in place: bariatric bed with overhead trapeze so pt can reposition herself.  Encourage pt to turn frequently

## 2019-01-01 NOTE — PROGRESS NOTES
Internal Medicine Interval Note  Note Author: Deja Taylor M.D.     Name Christy Najera       1970   Age/Sex 48 y.o. female   MRN [id number]   Code Status FULL CODE     After 5PM or if no immediate response to page, please call for cross-coverage  Attending/Team: Dr. Cuevas / Lokesh See Patient List for primary contact information  Call [phone] to page    1st Call - Day Intern (R1):   Dr. Taylor 2nd Call - Day Sr. Resident (R2/R3):   Dr. Hurley     Reason for interval visit  (Principal Problem)   Right knee pain and back pain    Interval Problem Daily Status Update  (24 hours, problem oriented, brief subjective history, new lab/imaging data pertinent to that problem)   - The patient was evaluated at the bedside. Morbidly obese female complaining of pain on the right knee. She missed her pain clinic appointment last Friday and was admitted for outpatient MRI as she does not fit into MRI machine in this hospital. She states that her pain is still present on the right side. She has chronic left knee joint pain.   - I explained her that she needs to consider bariatric surgery at this point.   - All the relevant labs and imaging were reviewed this morning.   - Christy Najera's vitals were stable overnight and this morning .  -I discussed the plan of care for the day with the patient this morning.  -All the questions were answered this morning.    Review of Systems   Constitutional: Negative.  Negative for chills and fever.   HENT: Negative.  Negative for ear pain, sinus pain and sore throat.    Eyes: Negative.  Negative for pain and redness.   Respiratory: Negative.  Negative for cough, sputum production and shortness of breath.    Cardiovascular: Negative.  Negative for chest pain, palpitations and leg swelling.   Gastrointestinal: Negative.  Negative for abdominal pain, constipation, diarrhea, nausea and vomiting.   Genitourinary: Negative.  Negative for dysuria and flank pain.    Musculoskeletal: Positive for back pain, falls, joint pain and myalgias. Negative for neck pain.   Skin: Negative.  Negative for itching and rash.   Neurological: Negative.  Negative for tremors, seizures, weakness and headaches.   Endo/Heme/Allergies: Negative.  Negative for environmental allergies and polydipsia. Does not bruise/bleed easily.   Psychiatric/Behavioral: Negative.  Negative for depression and suicidal ideas.   All other systems reviewed and are negative.    Disposition/Barriers to discharge:   Clinical Improvement and outpatient MRI    Consultants/Specialty  None    PCP: Ramez Raya M.D.    Quality Measures  Quality-Core Measures   Reviewed items::  Labs reviewed, Medications reviewed and Radiology images reviewed  Cortes catheter::  No Cortes  DVT: Xarelto.  Ulcer Prophylaxis::  Not indicated    Physical Exam       Vitals:    12/31/18 2300 12/31/18 2331 01/01/19 0017 01/01/19 0453   BP:   127/84 121/67   Pulse: 78 79 78 65   Resp:   19 17   Temp:   36.4 °C (97.6 °F) 36.5 °C (97.7 °F)   TempSrc:   Temporal Temporal   SpO2: 94% 92% 97% 96%   Weight:   (!) 183.8 kg (405 lb 3.3 oz)      Body mass index is 67.43 kg/m². Weight: (!) 183.8 kg (405 lb 3.3 oz)  Oxygen Therapy:  Pulse Oximetry: 96 %, O2 (LPM): 2, O2 Delivery: Nasal Cannula    Physical Exam   Constitutional: She is oriented to person, place, and time and well-developed, well-nourished, and in no distress. No distress.   HENT:   Head: Normocephalic and atraumatic.   Eyes: Pupils are equal, round, and reactive to light. EOM are normal.   Neck: Normal range of motion. Neck supple.   Cardiovascular: Normal rate, regular rhythm and normal heart sounds.    No murmur heard.  Pulmonary/Chest: Effort normal and breath sounds normal. No respiratory distress. She has no wheezes.   Abdominal: Soft. Bowel sounds are normal. She exhibits no distension. There is no tenderness.   Musculoskeletal: She exhibits edema.   Not able to move her right leg and  states that she has significant pain.  Right knee joint is not swollen or red however difficult to differentiate as she is morbidly obese.  Tenderness not present on the right knee joint. Tenderness present on the back.   Neurological: She is alert and oriented to person, place, and time.   Skin: Skin is warm and dry.   Psychiatric: Mood, memory, affect and judgment normal.   Nursing note and vitals reviewed.    Assessment/Plan     * Pain in both lower extremities- (present on admission)   Assessment & Plan    Likely secondary to radiculopathy vs arthritic pain vs pain from her weight. Patient was worked up by neurology for this during last visit and was not a surgical candidate. The patient was on 15mg of Oxycodone. Patient is allergic to tylenol, tramadol, lyrica, gabapentin, toradol.   - Continue 15mg of Oxycodone  - Continuous pulse ox given patient's sleep apnea and narcotic use.     Weakness of both lower extremities- (present on admission)   Assessment & Plan    Likely secondary to body habitus vs hypothyroid. Given patient's history and physical exam findings, not currently concerned for acute spine injury. Patient does have history of spinal stenosis of L4-L5.  - MRI on Wednesday to evaluate if spinal stenosis has gotten worse.   - Admit to neuro  - Q4h neuro checks  - Fall precautions.      Obstructive sleep apnea syndrome- (present on admission)   Assessment & Plan    Patient is on Nocturnal oxygen. Councelled for bariatric surgery.  - Continue Nocturnal oxymetry.   - Continuous pulse ox     Hypothyroidism- (present on admission)   Assessment & Plan    Last TSH was elevated. Given that patient's symptoms may be related to thyroid, follow-up thyroid panel.   - TSH 11.6 on admission  - Continue Synthroid 175 mcg and Liothyronine     Morbid obesity due to excess calories (HCC)   Assessment & Plan    Patient's BMI is 67.43. Counseled for bariatric surgery  - Patient may need outpatient bariatric surgery  consult to discuss weight loss.      Falls   Assessment & Plan    Likely secondary to weakness and obesity. Fall sounds as if it is mechanical given that patient was not dizzy/lightheaded.     Plan:  - Fall precautions  - Continue to monitor     History of DVT (deep vein thrombosis)- (present on admission)   Assessment & Plan    History of DVT, was using warfarin before. Started on Xarelto couple months ago. Occult blood negative.   - Continue Xarelto  - Follow up lower extremity dopper         Deja Taylor M.D.

## 2019-01-01 NOTE — ED NOTES
Patient assisted to BR via wheelchair per her request accompanied by ED Tech. Fall and safety precautions maintained.

## 2019-01-01 NOTE — DIETARY
NUTRITION SERVICES:  Pt with BMI 67.43. Weight loss counseling not appropriate in acute care setting.     RECOMMENDATION: Referral to outpatient nutrition services for weight management after D/C.

## 2019-01-01 NOTE — ED PROVIDER NOTES
"ED Provider Note    Scribed for Uziel Darby M.D. by Abigail Redmond. 12/31/2018, 6:19 PM.    Primary care provider: Ramez Raya M.D.  Means of arrival: wheel chair  History obtained from: patient  History limited by: none    CHIEF COMPLAINT  Chief Complaint   Patient presents with   • Leg Swelling     left lower leg pain/swelling/redness        HPI  Christy Najera is a 48 y.o. female who presents to the Emergency Department for evaluation of right knee pain onset approximately 7.5 hours ago. Patient describes the pain as \"someone crushing it\". Patient is unable to ambulate secondary to the pain. Patient reports some associated numbness to her right leg that she describes as \"tingling like my leg is going to sleep\". She states that approximately 7.5 hours ago her knees suddenly bent and she fell backwards. Patient states she felt as if she lost her balance. Additionally, she reports some bilateral leg swelling and weakness. She reports a past medical history of spinal stenosis to L-5 and chronic back pain. She denies ankle pain or incontinence.     REVIEW OF SYSTEMS  Pertinent positives include bilateral leg swelling, right knee pain, right leg numbness and tingling, unable to ambulate, fall, bilateral leg weakness, chronic back pain. Pertinent negatives include ankle pain and incontience. All other systems negative.    PAST MEDICAL HISTORY   has a past medical history of Congestive heart failure (HCC); Hypertension; Neuropathy (HCC); Spinal stenosis; and Stroke (HCC).    SURGICAL HISTORY   has a past surgical history that includes gyn surgery; appendectomy; cholecystectomy; and open reduction.    SOCIAL HISTORY  Social History   Substance Use Topics   • Smoking status: Never Smoker   • Smokeless tobacco: Never Used   • Alcohol use No      History   Drug Use No       FAMILY HISTORY  Family History   Problem Relation Age of Onset   • Cancer Mother    • Heart Disease Father    • Heart Disease " Brother        CURRENT MEDICATIONS  Home Medications     Reviewed by Radha Valenzuela R.N. (Registered Nurse) on 01/01/19 at 0017  Med List Status: Complete   Medication Last Dose Status   carisoprodol (SOMA) 350 MG Tab  Active   DILTIAZem (CARDIZEM) 60 MG Tab 12/31/2018 Active   DULoxetine (CYMBALTA) 60 MG Cap DR Particles delayed-release capsule 12/31/2018 Active   levothyroxine (SYNTHROID) 175 MCG Tab 12/31/2018 Active   liothyronine (CYTOMEL) 5 MCG Tab 12/31/2018 Active   oxycodone (OXY-IR) 15 MG immediate release tablet 12/31/2018 Active   rivaroxaban (XARELTO) 20 MG Tab tablet 12/31/2018 Active                ALLERGIES  Allergies   Allergen Reactions   • Cillins [Penicillins]      swollen   • Gabapentin Hives   • Lyrica Hives   • Phenergan [Promethazine]    • Toradol      welts   • Tramadol      welts   • Tylenol      welts       PHYSICAL EXAM  VITAL SIGNS: BP (!) 161/94   Pulse 85   Temp 36.1 °C (97 °F) (Temporal)   Resp 16   Wt (!) 166.7 kg (367 lb 8.1 oz)   SpO2 94%   BMI 61.16 kg/m²     Constitutional: Well developed, Well nourished, mild distress.   HENT: Normocephalic, Atraumatic, Oropharynx moist.   Eyes: Conjunctiva normal, No discharge.   Cardiovascular: Normal heart rate, Normal rhythm, No murmurs, equal pulses.   Pulmonary: Normal breath sounds, No respiratory distress, No wheezing, No rales, No rhonchi.  Chest: No chest wall tenderness or deformity.   Abdomen: Morbidly obese. Soft, No tenderness, No masses, no rebound, no guarding.   Musculoskeletal: No major deformities noted. Normally capillary refill in all 5 toes.  Skin: Warm, Dry, No erythema, No rash.   Neurologic: Alert & oriented x 3, Normal motor function,  No focal deficits noted. Not able to flex knee without significant pain. Reports significant decreased sensation throughout right leg.  Even to needle stick to the right leg she reports no sensation.  Psychiatric: Affect normal, Judgment normal, Mood normal.     LABS  Results for  orders placed or performed during the hospital encounter of 12/31/18   CBC WITH DIFFERENTIAL   Result Value Ref Range    WBC 7.8 4.8 - 10.8 K/uL    RBC 4.66 4.20 - 5.40 M/uL    Hemoglobin 14.0 12.0 - 16.0 g/dL    Hematocrit 42.2 37.0 - 47.0 %    MCV 90.6 81.4 - 97.8 fL    MCH 30.0 27.0 - 33.0 pg    MCHC 33.2 (L) 33.6 - 35.0 g/dL    RDW 42.5 35.9 - 50.0 fL    Platelet Count 301 164 - 446 K/uL    MPV 9.8 9.0 - 12.9 fL    Neutrophils-Polys 68.10 44.00 - 72.00 %    Lymphocytes 23.10 22.00 - 41.00 %    Monocytes 6.80 0.00 - 13.40 %    Eosinophils 0.90 0.00 - 6.90 %    Basophils 0.80 0.00 - 1.80 %    Immature Granulocytes 0.30 0.00 - 0.90 %    Nucleated RBC 0.00 /100 WBC    Neutrophils (Absolute) 5.28 2.00 - 7.15 K/uL    Lymphs (Absolute) 1.79 1.00 - 4.80 K/uL    Monos (Absolute) 0.53 0.00 - 0.85 K/uL    Eos (Absolute) 0.07 0.00 - 0.51 K/uL    Baso (Absolute) 0.06 0.00 - 0.12 K/uL    Immature Granulocytes (abs) 0.02 0.00 - 0.11 K/uL    NRBC (Absolute) 0.00 K/uL   BASIC METABOLIC PANEL   Result Value Ref Range    Sodium 138 135 - 145 mmol/L    Potassium 4.0 3.6 - 5.5 mmol/L    Chloride 102 96 - 112 mmol/L    Co2 27 20 - 33 mmol/L    Glucose 99 65 - 99 mg/dL    Bun 14 8 - 22 mg/dL    Creatinine 0.80 0.50 - 1.40 mg/dL    Calcium 10.0 8.5 - 10.5 mg/dL    Anion Gap 9.0 0.0 - 11.9   APTT   Result Value Ref Range    APTT 31.6 24.7 - 36.0 sec   PROTHROMBIN TIME (INR)   Result Value Ref Range    PT 19.4 (H) 12.0 - 14.6 sec    INR 1.63 (H) 0.87 - 1.13   ESTIMATED GFR   Result Value Ref Range    GFR If African American >60 >60 mL/min/1.73 m 2    GFR If Non African American >60 >60 mL/min/1.73 m 2   MAGNESIUM   Result Value Ref Range    Magnesium 1.6 1.5 - 2.5 mg/dL   PHOSPHORUS   Result Value Ref Range    Phosphorus 2.8 2.5 - 4.5 mg/dL   TSH (Thyroid Stimulating Hormone)   Result Value Ref Range    TSH 11.630 (H) 0.380 - 5.330 uIU/mL   FREE THYROXINE   Result Value Ref Range    Free T-4 1.04 0.53 - 1.43 ng/dL   T3 FREE   Result  Value Ref Range    T3,Free 2.94 2.40 - 4.20 pg/mL     All labs reviewed by me.    RADIOLOGY  DX-HIP-COMPLETE - UNILATERAL 2+ RIGHT   Final Result      1.  No radiographic evidence of acute traumatic injury.      DX-KNEE COMPLETE 4+ RIGHT   Final Result         1. No acute osseous abnormality.      DX-LUMBAR SPINE-2 OR 3 VIEWS   Final Result      1.  There is no acute fracture or malalignment of the lumbar spine.   2.  There is stable grade 1 anterior spondylolisthesis at the L4-5 level.   3.  There is stable multilevel degenerative lumbar spondylosis.      MR-LUMBAR SPINE-W/O    (Results Pending)   US-EXTREMITY VENOUS LOWER BILAT    (Results Pending)     The radiologist's interpretation of all radiological studies have been reviewed by me.    COURSE & MEDICAL DECISION MAKING  Pertinent Labs & Imaging studies reviewed. (See chart for details)    Review of records show the patient was admitted on 12/10 with left leg weakness. Patient has a past medical history of instability at L-4 and L-5.    5:51 PM - Ordered estimated gfr.    6:19 PM - Patient seen and examined at bedside. Patient will be treated with Dilaudid injection. Ordered lumbar spine x-ray, right hip x-ray, right knee x-ray, CBC, BMP, APTT, prothrombin to evaluate her symptoms. The differential diagnoses include but are not limited to: fracture, dislocation, spinal stenosis, sciatica.    8:24 PM - On reassessment I poked the patient's feet with a needle all over and she reported not feeling any pokes on her right foot and feeling all of the pokes on her left foot.    8:34 PM - MR-lumbar spine ordered.    8:43 PM - Per nurse, patient requested medication for anxiety. Ordered dilaudid injection and Ativan injection 1 mg.    8:56 PM - Paged [facility] (hospitalist).    9:00 PM -I discussed the patient's case and the above findings with [facility] (hospitalist) who agreed to admit the patient.    9:04 PM - I informed the patient of plan of admission and she was agreeable at  this time.     Medical Decision Making: At this point time patient does not show any signs of acute fracture.  She does have new numbness according to her with decreased skin sensation even the sharp needle poke.  I am concerned that her previous disc bulge may have worsened.  I think the patient will require an MRI.  Unfortunately because of the patient's weight that she will have to wait for our outpatient MRI scanner to be available.  Therefore the patient will be admitted.  She did not show any acute signs of cauda equina I do not suspect a acute surgical emergency at this point in time.    DISPOSITION:  Patient will be admitted to [facility] in guarded condition.     FINAL IMPRESSION  1. Radicular syndrome of right leg    2. Acute midline low back pain with right-sided sciatica    3. Acute pain of right knee    4. Fall, initial encounter          Abigail SHEARER (Tyra), am scribing for, and in the presence of, Uziel Darby M.D.    Electronically signed by: Abigail Redmond (Tyra), 12/31/2018    Uziel SHEARER M.D. personally performed the services described in this documentation, as scribed by Abigail Redmond in my presence, and it is both accurate and complete. C.    The note accurately reflects work and decisions made by me.  Uziel Darby  1/1/2019  1:18 AM\

## 2019-01-02 ENCOUNTER — APPOINTMENT (OUTPATIENT)
Dept: RADIOLOGY | Facility: MEDICAL CENTER | Age: 49
DRG: 552 | End: 2019-01-02
Attending: EMERGENCY MEDICINE

## 2019-01-02 PROCEDURE — 770006 HCHG ROOM/CARE - MED/SURG/GYN SEMI*

## 2019-01-02 PROCEDURE — A9270 NON-COVERED ITEM OR SERVICE: HCPCS | Performed by: STUDENT IN AN ORGANIZED HEALTH CARE EDUCATION/TRAINING PROGRAM

## 2019-01-02 PROCEDURE — 72148 MRI LUMBAR SPINE W/O DYE: CPT

## 2019-01-02 PROCEDURE — 99232 SBSQ HOSP IP/OBS MODERATE 35: CPT | Mod: GC | Performed by: HOSPITALIST

## 2019-01-02 PROCEDURE — 700102 HCHG RX REV CODE 250 W/ 637 OVERRIDE(OP): Performed by: STUDENT IN AN ORGANIZED HEALTH CARE EDUCATION/TRAINING PROGRAM

## 2019-01-02 PROCEDURE — 700111 HCHG RX REV CODE 636 W/ 250 OVERRIDE (IP): Performed by: HOSPITALIST

## 2019-01-02 PROCEDURE — 94760 N-INVAS EAR/PLS OXIMETRY 1: CPT

## 2019-01-02 RX ORDER — LORAZEPAM 2 MG/ML
0.5 INJECTION INTRAMUSCULAR ONCE
Status: COMPLETED | OUTPATIENT
Start: 2019-01-02 | End: 2019-01-02

## 2019-01-02 RX ADMIN — OXYCODONE HYDROCHLORIDE 15 MG: 5 TABLET ORAL at 14:45

## 2019-01-02 RX ADMIN — CARISOPRODOL 350 MG: 350 TABLET ORAL at 06:20

## 2019-01-02 RX ADMIN — OXYCODONE HYDROCHLORIDE 15 MG: 5 TABLET ORAL at 23:04

## 2019-01-02 RX ADMIN — CARISOPRODOL 350 MG: 350 TABLET ORAL at 15:28

## 2019-01-02 RX ADMIN — MAGNESIUM GLUCONATE 500 MG ORAL TABLET 400 MG: 500 TABLET ORAL at 06:20

## 2019-01-02 RX ADMIN — DILTIAZEM HYDROCHLORIDE 60 MG: 60 TABLET, FILM COATED ORAL at 06:20

## 2019-01-02 RX ADMIN — LORAZEPAM 0.5 MG: 2 INJECTION INTRAMUSCULAR; INTRAVENOUS at 09:39

## 2019-01-02 RX ADMIN — OXYCODONE HYDROCHLORIDE 15 MG: 5 TABLET ORAL at 06:19

## 2019-01-02 RX ADMIN — LEVOTHYROXINE SODIUM 175 MCG: 175 TABLET ORAL at 06:20

## 2019-01-02 RX ADMIN — MAGNESIUM GLUCONATE 500 MG ORAL TABLET 400 MG: 500 TABLET ORAL at 17:12

## 2019-01-02 RX ADMIN — STANDARDIZED SENNA CONCENTRATE AND DOCUSATE SODIUM 2 TABLET: 8.6; 5 TABLET, FILM COATED ORAL at 06:20

## 2019-01-02 RX ADMIN — DULOXETINE HYDROCHLORIDE 60 MG: 60 CAPSULE, DELAYED RELEASE ORAL at 06:20

## 2019-01-02 RX ADMIN — RIVAROXABAN 20 MG: 20 TABLET, FILM COATED ORAL at 06:20

## 2019-01-02 RX ADMIN — LIOTHYRONINE SODIUM 5 MCG: 5 TABLET ORAL at 06:20

## 2019-01-02 RX ADMIN — STANDARDIZED SENNA CONCENTRATE AND DOCUSATE SODIUM 2 TABLET: 8.6; 5 TABLET, FILM COATED ORAL at 17:13

## 2019-01-02 ASSESSMENT — ENCOUNTER SYMPTOMS
SPUTUM PRODUCTION: 0
FEVER: 0
SENSORY CHANGE: 0
CHILLS: 0
DEPRESSION: 0
NERVOUS/ANXIOUS: 0
BACK PAIN: 1
PALPITATIONS: 0
HEARTBURN: 0
BLURRED VISION: 0
FALLS: 0
COUGH: 0
MYALGIAS: 0
SPEECH CHANGE: 0
DIARRHEA: 0
PND: 0
DIAPHORESIS: 0
DIZZINESS: 0
CONSTIPATION: 0
SHORTNESS OF BREATH: 0
EYE DISCHARGE: 0
FLANK PAIN: 0
WHEEZING: 0
NECK PAIN: 0
ABDOMINAL PAIN: 0

## 2019-01-02 ASSESSMENT — PAIN SCALES - GENERAL
PAINLEVEL_OUTOF10: 8
PAINLEVEL_OUTOF10: 5
PAINLEVEL_OUTOF10: 2
PAINLEVEL_OUTOF10: 6
PAINLEVEL_OUTOF10: 8
PAINLEVEL_OUTOF10: 5

## 2019-01-02 ASSESSMENT — COPD QUESTIONNAIRES
HAVE YOU SMOKED AT LEAST 100 CIGARETTES IN YOUR ENTIRE LIFE: NO/DON'T KNOW
DURING THE PAST 4 WEEKS HOW MUCH DID YOU FEEL SHORT OF BREATH: NONE/LITTLE OF THE TIME
COPD SCREENING SCORE: 0
DO YOU EVER COUGH UP ANY MUCUS OR PHLEGM?: NO/ONLY WITH OCCASIONAL COLDS OR INFECTIONS

## 2019-01-02 ASSESSMENT — LIFESTYLE VARIABLES: EVER_SMOKED: NEVER

## 2019-01-02 NOTE — THERAPY
"Occupational Therapy Evaluation completed.   Functional Status:    49 yo female admitted with GLF, with c/o low back pain and worsening RLE weakness. Pt reports that the fall occurred when she wasn't using her AD, which she admits to not using occasionally.  Pt completes sup>sit with Supv, mod A for LB dressing (receives assist at baseline), CGA for UB dressing, CGA for sit><stand, CGA and VCs for toilet xfer. Pt has slight limp when ambulating with FWW, but doesn't have any losses of balance. Pt returned b2b and sit>sup with supv. Pt mobilizes fairly well, appears to be inconsistent with perceived level of function vs. Demonstration of function today.    Plan of Care: Will benefit from Occupational Therapy 3 times per week  Discharge Recommendations:  Equipment: Will Continue to Assess for Equipment Needs. Post-acute therapy Recommend home health or outpatient transitional care services for continued occupational therapy services      See \"Rehab Therapy-Acute\" Patient Summary Report for complete documentation.    "

## 2019-01-02 NOTE — CARE PLAN
Problem: Pain Management  Goal: Pain level will decrease to patient's comfort goal    Intervention: Follow pain managment plan developed in collaboration with patient and Interdisciplinary Team  Pt follows medication regimen as well as repositioning and rest       Problem: Mobility  Goal: Risk for activity intolerance will decrease    Intervention: Assess and monitor signs of activity intolerance  Pt uses call light for assistance to bathroom. Pt uses FWW. Gait is steady

## 2019-01-02 NOTE — CARE PLAN
Problem: Pain Management  Goal: Pain level will decrease to patient's comfort goal  Pt experiencing pain 6-8/10 throughout shift. Educated patient that pain medication is only available every 8 hours. Pt medicated per MAR. No S/S of pain noted.    Problem: Mobility  Goal: Risk for activity intolerance will decrease  Pt up standby with FWW. Steady, tolerates well.

## 2019-01-02 NOTE — CARE PLAN
Problem: Pain Management  Goal: Pain level will decrease to patient's comfort goal  Pt states pain management plan working for them, pain controlled    Problem: Safety  Goal: Will remain free from injury  Educated on safety measures, using call light ect

## 2019-01-02 NOTE — FACE TO FACE
Face to Face Supporting Documentation - Home Health    The encounter with this patient was in whole or in part the primary reason for home health admission.    Date of encounter:   Patient:                    MRN:                       YOB: 2020  Christy Najera  [id number]  1970     Home health to see patient for:  Physical Therapy evaluation and treatment    Skilled need for:  Exacerbation of Chronic Disease State Chronic back pain secondary to spinal stenosis  and Comment: Need for Home PT as per PT Evaluation     Skilled nursing interventions to include:  Comment: Physical therapy    Homebound status evidenced by:  Leaving home requires a considerable and taxing effort. There is a normal inability to leave the home.    Community Physician to provide follow up care: Ramez Raya M.D.     Optional Interventions? No      I certify the face to face encounter for this home health care referral meets the CMS requirements and the encounter/clinical assessment with the patient was, in whole, or in part, for the medical condition(s) listed above, which is the primary reason for home health care. Based on my clinical findings: the service(s) are medically necessary, support the need for home health care, and the homebound criteria are met.  I certify that this patient has had a face to face encounter by myself.  Uli Hurley M.D. - NPI: [id number]

## 2019-01-02 NOTE — THERAPY
"Physical Therapy Evaluation completed.   Bed Mobility:  Supine to Sit: Supervised  Transfers: Sit to Stand: Contact Guard Assist  Gait: Level Of Assist: Contact Guard Assist with Front-Wheel Walker       Plan of Care: Will benefit from Physical Therapy 3 times per week  Discharge Recommendations: Equipment: Will Continue to Assess for Equipment Needs.Recommend outpatient or home health transitional care services for continued physical therapy services.    See \"Rehab Therapy-Acute\" Patient Summary Report for complete documentation.     Pt was recenlty admitted for a GLF and reports of low back pain with progresivelly worsening weakness in RLE. Pt presented with impaired balance, impaired gait, pain, weakness, and poor activity tolerance. Pt was able to demonstrate CGA to SPV for all functional mobility at this time w/FWW use. Pt demonstrated with poor WB tolerance on RLE, however, she was able to WB during transfer from toilet which appeared to be full WB on RLE. Pt c/o pain throughout session in RLE; pt demonstrated with 0/5 MMT during formal testing, however, presented with inconsistent functional activites, as the patient was able to get both BLE back into bed with no assist and difficulty. During MMT, pt presented with poor effort. Pt presents with poor foot clearence on RLE during ambulation and uses UE to offload RLE. Pt refused to continue further ambulation from room or attempt stairs at this time due to fatigue and pain in RLE. Pt will continue to benefit from skilled PT while in house, anticipate pt to d/c home with outpatient therapy services and family support once pt improves in functional mobility and pain is medically managed.   "

## 2019-01-03 ENCOUNTER — HOSPITAL ENCOUNTER (EMERGENCY)
Facility: MEDICAL CENTER | Age: 49
End: 2019-01-03
Attending: EMERGENCY MEDICINE

## 2019-01-03 ENCOUNTER — APPOINTMENT (OUTPATIENT)
Dept: INTERNAL MEDICINE | Facility: MEDICAL CENTER | Age: 49
End: 2019-01-03

## 2019-01-03 ENCOUNTER — OFFICE VISIT (OUTPATIENT)
Dept: INTERNAL MEDICINE | Facility: MEDICAL CENTER | Age: 49
End: 2019-01-03

## 2019-01-03 VITALS
BODY MASS INDEX: 48.82 KG/M2 | HEIGHT: 65 IN | WEIGHT: 293 LBS | OXYGEN SATURATION: 97 % | TEMPERATURE: 98.4 F | HEART RATE: 110 BPM | DIASTOLIC BLOOD PRESSURE: 68 MMHG | SYSTOLIC BLOOD PRESSURE: 122 MMHG

## 2019-01-03 VITALS
SYSTOLIC BLOOD PRESSURE: 113 MMHG | RESPIRATION RATE: 18 BRPM | DIASTOLIC BLOOD PRESSURE: 65 MMHG | OXYGEN SATURATION: 95 % | BODY MASS INDEX: 67.43 KG/M2 | WEIGHT: 293 LBS | HEART RATE: 77 BPM | TEMPERATURE: 97.9 F

## 2019-01-03 VITALS
BODY MASS INDEX: 48.82 KG/M2 | TEMPERATURE: 98.5 F | OXYGEN SATURATION: 95 % | HEIGHT: 65 IN | DIASTOLIC BLOOD PRESSURE: 76 MMHG | SYSTOLIC BLOOD PRESSURE: 148 MMHG | HEART RATE: 95 BPM | RESPIRATION RATE: 18 BRPM | WEIGHT: 293 LBS

## 2019-01-03 DIAGNOSIS — M79.605 PAIN IN BOTH LOWER EXTREMITIES: ICD-10-CM

## 2019-01-03 DIAGNOSIS — G89.29 CHRONIC MIDLINE LOW BACK PAIN WITH RIGHT-SIDED SCIATICA: ICD-10-CM

## 2019-01-03 DIAGNOSIS — M79.604 PAIN IN BOTH LOWER EXTREMITIES: ICD-10-CM

## 2019-01-03 DIAGNOSIS — Z76.0 MEDICATION REFILL: ICD-10-CM

## 2019-01-03 DIAGNOSIS — G25.81 RESTLESS LEG: ICD-10-CM

## 2019-01-03 DIAGNOSIS — M54.41 CHRONIC MIDLINE LOW BACK PAIN WITH RIGHT-SIDED SCIATICA: ICD-10-CM

## 2019-01-03 DIAGNOSIS — E03.9 HYPOTHYROIDISM, UNSPECIFIED TYPE: ICD-10-CM

## 2019-01-03 PROCEDURE — 99239 HOSP IP/OBS DSCHRG MGMT >30: CPT | Mod: GC | Performed by: HOSPITALIST

## 2019-01-03 PROCEDURE — 700102 HCHG RX REV CODE 250 W/ 637 OVERRIDE(OP): Performed by: STUDENT IN AN ORGANIZED HEALTH CARE EDUCATION/TRAINING PROGRAM

## 2019-01-03 PROCEDURE — 700102 HCHG RX REV CODE 250 W/ 637 OVERRIDE(OP): Performed by: EMERGENCY MEDICINE

## 2019-01-03 PROCEDURE — 99284 EMERGENCY DEPT VISIT MOD MDM: CPT

## 2019-01-03 PROCEDURE — A9270 NON-COVERED ITEM OR SERVICE: HCPCS | Performed by: EMERGENCY MEDICINE

## 2019-01-03 PROCEDURE — A9270 NON-COVERED ITEM OR SERVICE: HCPCS | Performed by: STUDENT IN AN ORGANIZED HEALTH CARE EDUCATION/TRAINING PROGRAM

## 2019-01-03 PROCEDURE — 99214 OFFICE O/P EST MOD 30 MIN: CPT | Performed by: INTERNAL MEDICINE

## 2019-01-03 RX ORDER — LIOTHYRONINE SODIUM 5 UG/1
5 TABLET ORAL DAILY
Qty: 30 TAB | Refills: 1 | Status: SHIPPED | OUTPATIENT
Start: 2019-01-03

## 2019-01-03 RX ORDER — CARISOPRODOL 350 MG/1
350 TABLET ORAL 3 TIMES DAILY PRN
Qty: 40 TAB | Refills: 0 | Status: SHIPPED | OUTPATIENT
Start: 2019-01-03 | End: 2019-01-03

## 2019-01-03 RX ORDER — OXYCODONE HYDROCHLORIDE 15 MG/1
15 TABLET ORAL EVERY 8 HOURS PRN
Qty: 10 TAB | Refills: 0 | Status: SHIPPED | OUTPATIENT
Start: 2019-01-03 | End: 2019-01-06

## 2019-01-03 RX ORDER — OXYCODONE HYDROCHLORIDE 5 MG/1
15 TABLET ORAL ONCE
Status: COMPLETED | OUTPATIENT
Start: 2019-01-03 | End: 2019-01-03

## 2019-01-03 RX ADMIN — LIOTHYRONINE SODIUM 5 MCG: 5 TABLET ORAL at 05:44

## 2019-01-03 RX ADMIN — MAGNESIUM GLUCONATE 500 MG ORAL TABLET 400 MG: 500 TABLET ORAL at 05:44

## 2019-01-03 RX ADMIN — OXYCODONE HYDROCHLORIDE 15 MG: 5 TABLET ORAL at 15:09

## 2019-01-03 RX ADMIN — DILTIAZEM HYDROCHLORIDE 60 MG: 60 TABLET, FILM COATED ORAL at 05:44

## 2019-01-03 RX ADMIN — STANDARDIZED SENNA CONCENTRATE AND DOCUSATE SODIUM 2 TABLET: 8.6; 5 TABLET, FILM COATED ORAL at 05:44

## 2019-01-03 RX ADMIN — DULOXETINE HYDROCHLORIDE 60 MG: 60 CAPSULE, DELAYED RELEASE ORAL at 05:44

## 2019-01-03 RX ADMIN — CARISOPRODOL 350 MG: 350 TABLET ORAL at 01:28

## 2019-01-03 RX ADMIN — RIVAROXABAN 20 MG: 20 TABLET, FILM COATED ORAL at 05:44

## 2019-01-03 RX ADMIN — OXYCODONE HYDROCHLORIDE 15 MG: 5 TABLET ORAL at 19:57

## 2019-01-03 RX ADMIN — OXYCODONE HYDROCHLORIDE 15 MG: 5 TABLET ORAL at 07:44

## 2019-01-03 RX ADMIN — LEVOTHYROXINE SODIUM 175 MCG: 175 TABLET ORAL at 05:44

## 2019-01-03 RX ADMIN — CARISOPRODOL 350 MG: 350 TABLET ORAL at 11:17

## 2019-01-03 ASSESSMENT — ENCOUNTER SYMPTOMS
NEUROLOGICAL NEGATIVE: 1
SINUS PAIN: 0
FEVER: 0
RESPIRATORY NEGATIVE: 1
BACK PAIN: 1
CARDIOVASCULAR NEGATIVE: 1
CHILLS: 0
VOMITING: 0
GASTROINTESTINAL NEGATIVE: 1
HEADACHES: 0
POLYDIPSIA: 0
CARDIOVASCULAR NEGATIVE: 1
EYE REDNESS: 0
RESPIRATORY NEGATIVE: 1
NAUSEA: 0
FLANK PAIN: 0
FALLS: 1
SHORTNESS OF BREATH: 0
EYES NEGATIVE: 1
WEAKNESS: 0
MYALGIAS: 1
FALLS: 1
SPUTUM PRODUCTION: 0
ABDOMINAL PAIN: 0
PSYCHIATRIC NEGATIVE: 1
PALPITATIONS: 0
MYALGIAS: 0
NECK PAIN: 0
CONSTITUTIONAL NEGATIVE: 1
COUGH: 0
TREMORS: 0
BACK PAIN: 1
CONSTIPATION: 0
EYE PAIN: 0
NECK PAIN: 0
DIARRHEA: 0
BRUISES/BLEEDS EASILY: 0
DEPRESSION: 0
SORE THROAT: 0
SEIZURES: 0

## 2019-01-03 ASSESSMENT — LIFESTYLE VARIABLES: DO YOU DRINK ALCOHOL: NO

## 2019-01-03 ASSESSMENT — PAIN SCALES - GENERAL
PAINLEVEL_OUTOF10: 8
PAINLEVEL: 7=MODERATE-SEVERE PAIN
PAINLEVEL_OUTOF10: 7

## 2019-01-03 NOTE — DISCHARGE INSTRUCTIONS
Discharge Instructions    Discharged to home by [facility] with escort. Discharged via wheelchair, hospital escort: Yes.  Special equipment needed: Not Applicable    Be sure to schedule a follow-up appointment with your primary care doctor or any specialists as instructed.     Discharge Plan:   Diet Plan: Discussed  Activity Level: Discussed  Confirmed Follow up Appointment: Patient to Call and Schedule Appointment  Confirmed Symptoms Management: Discussed  Medication Reconciliation Updated: Yes  Influenza Vaccine Indication: Not indicated: Previously immunized this influenza season and > 8 years of age    I understand that a diet low in cholesterol, fat, and sodium is recommended for good health. Unless I have been given specific instructions below for another diet, I accept this instruction as my diet prescription.   Other diet: Regular diet as tolerated    Special Instructions: None    · Is patient discharged on Warfarin / Coumadin?   No     Depression / Suicide Risk    As [facility], it is important to learn how to keep safe from harming yourself.    Recognize the warning signs:  · Abrupt changes in personality, positive or negative- including increase in energy   · Giving away possessions  · Change in eating patterns- significant weight changes-  positive or negative  · Change in sleeping patterns- unable to sleep or sleeping all the time   · Unwillingness or inability to communicate  · Depression  · Unusual sadness, discouragement and loneliness  · Talk of wanting to die  · Neglect of personal appearance   · Rebelliousness- reckless behavior  · Withdrawal from people/activities they love  · Confusion- inability to concentrate     If you or a loved one observes any of these behaviors or has concerns about self-harm, here's what you can do:  · Talk about it- your feelings and reasons for harming yourself  · Remove any means that you might use to hurt yourself (examples:  pills, rope, extension cords, firearm)  · Get professional help from the community (Mental Health, Substance Abuse, psychological counseling)  · Do not be alone:Call your Safe Contact- someone whom you trust who will be there for you.  · Call your local CRISIS HOTLINE 757-7155 or 615-408-5514  · Call your local Children's Mobile Crisis Response Team [place] (302) 676-4355 or www.Angstro  · Call the toll free National Suicide Prevention Hotlines   · National Suicide Prevention Lifeline 717-300-WLXH (6305)  · [employer] Hope Line Network 800-SUICIDE (516-5729)      Back Pain, Adult  Introduction  Back pain is very common. The pain often gets better over time. The cause of back pain is usually not dangerous. Most people can learn to manage their back pain on their own.  Follow these instructions at home:  Watch your back pain for any changes. The following actions may help to lessen any pain you are feeling:  · Stay active. Start with short walks on flat ground if you can. Try to walk farther each day.  · Exercise regularly as told by your doctor. Exercise helps your back heal faster. It also helps avoid future injury by keeping your muscles strong and flexible.  · Do not sit, drive, or  one place for more than 30 minutes.  · Do not stay in bed. Resting more than 1-2 days can slow down your recovery.  · Be careful when you bend or lift an object. Use good form when lifting:  ¨ Bend at your knees.  ¨ Keep the object close to your body.  ¨ Do not twist.  · Sleep on a firm mattress. Lie on your side, and bend your knees. If you lie on your back, put a pillow under your knees.  · Take medicines only as told by your doctor.  · Put ice on the injured area.  ¨ Put ice in a plastic bag.  ¨ Place a towel between your skin and the bag.  ¨ Leave the ice on for 20 minutes, 2-3 times a day for the first 2-3 days. After that, you can switch between ice and heat packs.  · Avoid feeling anxious or stressed. Find  good ways to deal with stress, such as exercise.  · Maintain a healthy weight. Extra weight puts stress on your back.  Contact a doctor if:  · You have pain that does not go away with rest or medicine.  · You have worsening pain that goes down into your legs or buttocks.  · You have pain that does not get better in one week.  · You have pain at night.  · You lose weight.  · You have a fever or chills.  Get help right away if:  · You cannot control when you poop (bowel movement) or pee (urinate).  · Your arms or legs feel weak.  · Your arms or legs lose feeling (numbness).  · You feel sick to your stomach (nauseous) or throw up (vomit).  · You have belly (abdominal) pain.  · You feel like you may pass out (faint).  This information is not intended to replace advice given to you by your health care provider. Make sure you discuss any questions you have with your health care provider.  Document Released: 06/05/2009 Document Revised: 05/25/2017 Document Reviewed: 04/21/2015  © 2017 [employer]

## 2019-01-03 NOTE — CARE PLAN
Problem: Pain Management  Goal: Pain level will decrease to patient's comfort goal  Outcome: PROGRESSING SLOWER THAN EXPECTED  Pt stating that the pain medication is not adeqately managing her pain, tried to alternate the pain med and muscle relaxer but pt was unable to wait the 4 hours to bridge. States she hopes to talk to the physicians in the next AM about something different or changing the frequency.     Problem: Mobility  Goal: Risk for activity intolerance will decrease  Outcome: PROGRESSING AS EXPECTED  Pt is able to safely ambulate from bed to bathroom with walker independently, uses call light appropriately.

## 2019-01-03 NOTE — ASSESSMENT & PLAN NOTE
- TSH 25 - > 11 this admission.   - No signs/symptoms of florid hypothyroidism.  - Continue Synthroid 175 mcg and Liothyronine

## 2019-01-03 NOTE — CARE PLAN
Problem: Pain Management  Goal: Pain level will decrease to patient's comfort goal    Intervention: Follow pain managment plan developed in collaboration with patient and Interdisciplinary Team  Pt frequently asks for pain medication. Pt received oxy 15 Q8H       Problem: Mobility  Goal: Risk for activity intolerance will decrease    Intervention: Assess and monitor signs of activity intolerance  Pt ambulates with FWW to bathroom, gait is steady

## 2019-01-03 NOTE — PROGRESS NOTES
Internal Medicine Interval Note  Note Author: Deja Taylor M.D.     Name Christy Najera       1970   Age/Sex 48 y.o. female   MRN [id number]   Code Status FULL CODE     After 5PM or if no immediate response to page, please call for cross-coverage  Attending/Team: Dr. Cuevas / Lokesh See Patient List for primary contact information  Call [phone] to page    1st Call - Day Intern (R1):   Dr. Taylor 2nd Call - Day Sr. Resident (R2/R3):   Dr. Hurley     Reason for interval visit  (Principal Problem)   Right knee pain and back pain    Interval Problem Daily Status Update  (24 hours, problem oriented, brief subjective history, new lab/imaging data pertinent to that problem)   - The patient was evaluated at the bedside. Morbidly obese female complaining of pain on the right knee. Her pain was not well controlled however I explained that she is on very high dose of pain medicine. Also, explained her that she will need home PT. She agreed to my plan.   - I explained her that she needs to consider bariatric surgery in the near future.   - She does not have an insurance and might not get home PT. She needs to see her PCP for further management plans.  - All the relevant labs and imaging were reviewed this morning. Explained her MRI results.  - Christy Najera's vitals were stable overnight and this morning.   -I discussed the plan of care for the day with the patient this morning.  -All the questions were answered this morning.    Review of Systems   Constitutional: Negative.  Negative for chills and fever.   HENT: Negative.  Negative for ear pain, sinus pain and sore throat.    Eyes: Negative.  Negative for pain and redness.   Respiratory: Negative.  Negative for cough, sputum production and shortness of breath.    Cardiovascular: Negative.  Negative for chest pain, palpitations and leg swelling.   Gastrointestinal: Negative.  Negative for abdominal pain, constipation, diarrhea, nausea and  vomiting.   Genitourinary: Negative.  Negative for dysuria and flank pain.   Musculoskeletal: Positive for back pain, falls, joint pain and myalgias. Negative for neck pain.   Skin: Negative.  Negative for itching and rash.   Neurological: Negative.  Negative for tremors, seizures, weakness and headaches.   Endo/Heme/Allergies: Negative.  Negative for environmental allergies and polydipsia. Does not bruise/bleed easily.   Psychiatric/Behavioral: Negative.  Negative for depression and suicidal ideas.   All other systems reviewed and are negative.    Disposition/Barriers to discharge:   Clinical Improvement and outpatient MRI    Consultants/Specialty  None    PCP: Ramez Raya M.D.    Quality Measures  Quality-Core Measures   Reviewed items::  Labs reviewed, Medications reviewed and Radiology images reviewed  Cortes catheter::  No Cortes  DVT: Xarelto.  Ulcer Prophylaxis::  Not indicated    Physical Exam       Vitals:    01/02/19 1639 01/02/19 2100 01/03/19 0500 01/03/19 0900   BP: 109/57 111/70 149/89 113/65   Pulse: 82 77 80 77   Resp: 17 17 17 18   Temp: 36.2 °C (97.2 °F) 36.4 °C (97.5 °F) 36.1 °C (97 °F) 36.6 °C (97.9 °F)   TempSrc: Temporal Temporal Temporal Temporal   SpO2: 93% 98% 95% 95%   Weight:         Body mass index is 67.43 kg/m².   Oxygen Therapy:  Pulse Oximetry: 95 %, O2 (LPM): 0, FiO2%: 21 %, O2 Delivery: None (Room Air)    Physical Exam   Constitutional: She is oriented to person, place, and time and well-developed, well-nourished, and in no distress. No distress.   HENT:   Head: Normocephalic and atraumatic.   Eyes: Pupils are equal, round, and reactive to light. EOM are normal.   Neck: Normal range of motion. Neck supple.   Cardiovascular: Normal rate, regular rhythm and normal heart sounds.    No murmur heard.  Pulmonary/Chest: Effort normal and breath sounds normal. No respiratory distress. She has no wheezes.   Abdominal: Soft. Bowel sounds are normal. She exhibits no distension. There is no  tenderness.   Musculoskeletal: She exhibits edema.   Not able to move her right leg and states that she has significant pain.  Right knee joint is not swollen or red however difficult to differentiate as she is morbidly obese.  Tenderness not present on the right knee joint. Tenderness present on the back.   Neurological: She is alert and oriented to person, place, and time.   Skin: Skin is warm and dry.   Psychiatric: Mood, memory, affect and judgment normal.   Nursing note and vitals reviewed.    Assessment/Plan     * Pain in both lower extremities- (present on admission)   Assessment & Plan    Likely secondary to radiculopathy vs arthritic pain vs pain from her weight.   - MRI lumbar spine: Multilevel multifactorial degenerative changes without appreciable interval change, Areas of MILD central canal and neural foraminal narrowing   - Patient was worked up by neurology for this during last visit and was not a surgical candidate due to weight  - Continue 15mg of Oxycodone.   - Inpatient PT and home PT per their recommendations. She might not get home PT as she does not have insurance and need to find a way to treat her back pain in outpatient setting.     Weakness of both lower extremities- (present on admission)   Assessment & Plan    Likely secondary to body habitus vs hypothyroid.   No evidence of acute spine injury.   Has a history of spinal stenosis of L4-L5 based on MRI. MRI during this hospitalizations did not show any change.  - Neuropathy and weakness are improving.  - Continue with PT sessions.     Obstructive sleep apnea syndrome- (present on admission)   Assessment & Plan    - Continue Nocturnal oxymetry.   - Continuous pulse ox     Hypothyroidism- (present on admission)   Assessment & Plan    - TSH 25 - > 11 this admission.   - No signs/symptoms of florid hypothyroidism.  - Continue Synthroid 175 mcg and Liothyronine     Morbid obesity due to excess calories (HCC)   Assessment & Plan    - Patient's  BMI is 67.43. Counseled for bariatric surgery  - Patient may need outpatient bariatric surgery consult to discuss weight loss.      Falls   Overview    Likely secondary to weakness and obesity. Fall sounds as if it is mechanical given that patient was not dizzy/lightheaded.      Assessment & Plan    - Fall precautions  - Continue to monitor     History of DVT (deep vein thrombosis)- (present on admission)   Overview    History of DVT, was using warfarin before.   Started on Xarelto couple months ago.   Occult blood negative.   DVT study negative     Assessment & Plan    - Continue Xarelto           Deja Taylor M.D.

## 2019-01-03 NOTE — DISCHARGE PLANNING
Received Transport Form @ 2599  Emailed to [employer] at [phone]  Spoke to Jose Maria @ [employer]     Transport is scheduled for [employer] @5969 going to [address]

## 2019-01-03 NOTE — CARE PLAN
Problem: Safety  Goal: Will remain free from falls    Intervention: Assess risk factors for falls  Call light within reach. Belongings within reach. Bed locked and in lowest position. Pt educated to call for assistance       Problem: Urinary Elimination:  Goal: Ability to reestablish a normal urinary elimination pattern will improve    Intervention: Evaluate need to continue indwelling urinary catheter  Pt continues to have pierce catheter in place. Pt states she suffers from urinary retention

## 2019-01-03 NOTE — PROGRESS NOTES
Pt d/c from unit. Pt transporting to appointment at [address] in [facility] van with staff CNA and transport staff. Pt escorted off unit in wheelchair. All belongings with pt.

## 2019-01-03 NOTE — DISCHARGE SUMMARY
Internal Medicine Discharge Summary  Note Author: Deja Taylor M.D.       Name Christy Najera 1970   Age/Sex 48 y.o. female   MRN [id number]         Admit Date:  2018       Discharge Date:   2019    Service:   [facility] Internal Medicine red Team  Attending Physician(s):   Dr. Munoz       Senior Resident(s):   Dr. Hurley  Filiberto Resident(s):   Dr. Taylor  PCP: Ramez Raya M.D.      Primary Diagnosis:   Back pain secondary to Multilevel multifactorial degenerative changes in the lumbar spine    Secondary Diagnoses:                Principal Problem:    Pain in both lower extremities POA: Yes  Active Problems:    Hypothyroidism POA: Yes    Obstructive sleep apnea syndrome POA: Yes    Weakness of both lower extremities POA: Yes    AF (atrial fibrillation) (HCC) POA: Yes    History of DVT (deep vein thrombosis) POA: Yes      Overview: History of DVT, was using warfarin before.       Started on Xarelto couple months ago.       Occult blood negative.       DVT study negative    Depression POA: Yes    Falls POA: Unknown      Overview: Likely secondary to weakness and obesity. Fall sounds as if it is       mechanical given that patient was not dizzy/lightheaded.     Morbid obesity due to excess calories (HCC) POA: Unknown  Resolved Problems:    * No resolved hospital problems. *      Hospital Summary (Brief Narrative):       This is a 49 yo morbidly obese (BMI: 67.43) patient with PMH of  DVT, Afib, hypothyroidism and chronic back pain presented with acute back pain. Her pain was controlled with oxycodone 15 mg Q8 in the hospital which was her home dose. Her MRI performed during this hospitalization did not show any acute changes in the lumbar spine. She has chronic multilevel multifactorial degenerative changes with mild central canal and neural foraminal narrowing. She was evaluated by a physical therapist who recommended home physical therapy, however, she does not have an  insurance and does not qualify for home physical therapy at this time. She was previously evaluated by a neurosurgery who recommended against surgery given her very high BMI. She might benefit from physical therapy if at all that is possible given she does not have insurance.She will be discharged to home today.    She has an appointment with Ramez Raya today for her pain medications and further evaluation of her back pain. Ultrasound doppler during this hospitalization did not show any DVT which was performed given history of Afib.     Patient /Hospital Summary (Details -- Problem Oriented) :          * Pain in both lower extremities   Assessment & Plan    Likely secondary to radiculopathy vs arthritic pain vs pain from her weight.   - MRI lumbar spine: Multilevel multifactorial degenerative changes without appreciable interval change, Areas of MILD central canal and neural foraminal narrowing   - Patient was worked up by neurology for this during last visit and was not a surgical candidate due to weight  - Continue 15mg of Oxycodone.   - Inpatient PT and home PT per their recommendations. She might not get home PT as she does not have insurance and need to find a way to treat her back pain in outpatient setting.     Weakness of both lower extremities   Assessment & Plan    Likely secondary to body habitus vs hypothyroid.   No evidence of acute spine injury.   Has a history of spinal stenosis of L4-L5 based on MRI. MRI during this hospitalizations did not show any change.  - Neuropathy and weakness are improving.  - Continue with PT sessions.     Obstructive sleep apnea syndrome   Assessment & Plan    - Continue Nocturnal oxymetry.   - Continuous pulse ox     Hypothyroidism   Assessment & Plan    - TSH 25 - > 11 this admission.   - No signs/symptoms of florid hypothyroidism.  - Continue Synthroid 175 mcg and Liothyronine     Morbid obesity due to excess calories (HCC)   Assessment & Plan    - Patient's BMI is  67.43. Counseled for bariatric surgery  - Patient may need outpatient bariatric surgery consult to discuss weight loss.      Falls   Overview    Likely secondary to weakness and obesity. Fall sounds as if it is mechanical given that patient was not dizzy/lightheaded.      Assessment & Plan    - Fall precautions  - Continue to monitor     History of DVT (deep vein thrombosis)   Overview    History of DVT, was using warfarin before.   Started on Xarelto couple months ago.   Occult blood negative.   DVT study negative     Assessment & Plan    - Continue Xarelto           Consultants:     None    Procedures:        None    Imaging/ Testing:      MR-LUMBAR SPINE-W/O   Final Result      1.  Multilevel multifactorial degenerative changes without appreciable interval change   2.  No areas of high-grade central canal narrowing   3.  Areas of central canal and neural foraminal narrowing as described above      US-EXTREMITY VENOUS LOWER BILAT   Final Result      DX-HIP-COMPLETE - UNILATERAL 2+ RIGHT   Final Result      1.  No radiographic evidence of acute traumatic injury.      DX-KNEE COMPLETE 4+ RIGHT   Final Result         1. No acute osseous abnormality.      DX-LUMBAR SPINE-2 OR 3 VIEWS   Final Result      1.  There is no acute fracture or malalignment of the lumbar spine.   2.  There is stable grade 1 anterior spondylolisthesis at the L4-5 level.   3.  There is stable multilevel degenerative lumbar spondylosis.          Discharge Medications:         Medication Reconciliation: Completed       Medication List      CONTINUE taking these medications      Instructions   carisoprodol 350 MG Tabs  Commonly known as:  SOMA   Take 350 mg by mouth 3 times a day as needed for Muscle Spasms.  Dose:  350 mg     DILTIAZem 60 MG Tabs  Commonly known as:  CARDIZEM   Take 1 Tab by mouth 2 Times a Day.  Dose:  60 mg     DULoxetine 60 MG Cpep delayed-release capsule  Commonly known as:  CYMBALTA   Take 1 Cap by mouth every day.  Dose:   60 mg     levothyroxine 175 MCG Tabs  Commonly known as:  SYNTHROID   Take 1 Tab by mouth Every morning on an empty stomach.  Dose:  175 mcg     liothyronine 5 MCG Tabs  Commonly known as:  CYTOMEL   Take 5 mcg by mouth every day.  Dose:  5 mcg     oxycodone 15 MG immediate release tablet  Commonly known as:  OXY-IR   Take 1 Tab by mouth every 8 hours as needed for up to 11 days.  Dose:  15 mg     rivaroxaban 20 MG Tabs tablet  Commonly known as:  XARELTO   Take 1 Tab by mouth every day.  Dose:  20 mg            Can use .DISCHARGEMEDSLIST if going to another facility         Disposition:   Home    Diet:   Low carbohydrate, low fat diet.    Activity:   As tolerated    Instructions:      Please see your PCP for pain medication in the clinic today.  Please take your medications as directed. Consider bariatric surgery in the near future for weight loss.      The patient was instructed to return to the ER in the event of worsening symptoms. I have counseled the patient on the importance of compliance and the patient has agreed to proceed with all medical recommendations and follow up plan indicated above.   The patient understands that all medications come with benefits and risks. Risks may include permanent injury or death and these risks can be minimized with close reassessment and monitoring.        Primary Care Provider:    Ramez Raya M.D.    Discharge summary faxed to primary care provider:  Completed  Copy of discharge summary given to the patient: Completed      Follow up appointment details :      With Ramez Raya M.D. Today.    Pending Studies:        None    Discharge Time (Minutes) :    >35 Time spent on discharge day patient visit, preparing discharge paperwork and arranging for patient follow up.  Hospital Course Type:  Inpatient Stay >2 midnights      Condition on Discharge  Stable  ______________________________________________________________________    Interval history/exam for day of  discharge:     Please refer to progress noes for detailed interval history and physical exam.      Most Recent Labs:    Lab Results   Component Value Date/Time    WBC 7.8 12/31/2018 05:51 PM    RBC 4.66 12/31/2018 05:51 PM    HEMOGLOBIN 14.0 12/31/2018 05:51 PM    HEMATOCRIT 42.2 12/31/2018 05:51 PM    MCV 90.6 12/31/2018 05:51 PM    MCH 30.0 12/31/2018 05:51 PM    MCHC 33.2 (L) 12/31/2018 05:51 PM    MPV 9.8 12/31/2018 05:51 PM    NEUTSPOLYS 68.10 12/31/2018 05:51 PM    LYMPHOCYTES 23.10 12/31/2018 05:51 PM    MONOCYTES 6.80 12/31/2018 05:51 PM    EOSINOPHILS 0.90 12/31/2018 05:51 PM    BASOPHILS 0.80 12/31/2018 05:51 PM      Lab Results   Component Value Date/Time    SODIUM 138 12/31/2018 05:51 PM    POTASSIUM 4.0 12/31/2018 05:51 PM    CHLORIDE 102 12/31/2018 05:51 PM    CO2 27 12/31/2018 05:51 PM    GLUCOSE 99 12/31/2018 05:51 PM    BUN 14 12/31/2018 05:51 PM    CREATININE 0.80 12/31/2018 05:51 PM      Lab Results   Component Value Date/Time    ALTSGPT 15 12/11/2018 01:04 AM    ASTSGOT 14 12/11/2018 01:04 AM    ALKPHOSPHAT 74 12/11/2018 01:04 AM    TBILIRUBIN 0.3 12/11/2018 01:04 AM    ALBUMIN 3.6 12/11/2018 01:04 AM    GLOBULIN 2.5 12/11/2018 01:04 AM    INR 1.63 (H) 12/31/2018 05:51 PM     Lab Results   Component Value Date/Time    PROTHROMBTM 19.4 (H) 12/31/2018 05:51 PM    INR 1.63 (H) 12/31/2018 05:51 PM

## 2019-01-03 NOTE — PROGRESS NOTES
Bedside report complete. Pt resting in a bariatric bed, heat packs given for pain in back. Too early for pain medications at this time. Belongings close at bedside. Call light within reach. Bed is locked and in low position. All other needs met at this time.

## 2019-01-04 NOTE — ED TRIAGE NOTES
".Christy Najera  .  Chief Complaint   Patient presents with   • Medication Refill     Patient to triage with above complaint. Needs refill of Cardizem, Cytomel, and oxycodone. Patient was discharge from [facility] today and was told to see PCP for refills, patient went to PCP' office though was sent to ER due to dr not being available. ./81   Pulse (!) 102   Temp 36.9 °C (98.5 °F) (Temporal)   Resp 18   Ht 1.651 m (5' 5\")   Wt (!) 166.7 kg (367 lb 8.1 oz)   SpO2 93%   BMI 61.16 kg/m²     Patient to lobby and instructed to inform staff of any needs.   "

## 2019-01-04 NOTE — ED NOTES
All lines and monitors discontinued. Discharge instructions given, questions answered.    wheeled out of ER, escorted by RN.  Instructed not to drive after taking pain medication and pt verbalizes understanding.  Rx x oxycodone given.

## 2019-01-04 NOTE — ED PROVIDER NOTES
ED Provider Note    Scribed for Jamie Perez M.D. by Mello Escalante. 1/3/2019  7:33 PM    Primary care provider: Ramez Raya M.D.  Means of arrival: Walk In  History obtained from: Patient  History limited by: None    CHIEF COMPLAINT  Chief Complaint   Patient presents with   • Medication Refill       HPI  Christy Najera is a 48 y.o. female who presents to the Emergency Department for a medication refill. The patient reports that she was discharged from the hospital today after being in here for spinal stenosis, neuropathy, and atrial fibrillation. When she was discharged, they told her that she should follow up with her primary care provider for her narcotic pain medication. The patient tried to call her primary care provider, Dr. Raya, but his office did not have availability to see the patient until January 16th. She contacted her doctor from the hospital who told her to come to the ED for a prescription for her pain medication. She denies experiencing fever.     REVIEW OF SYSTEMS  Pertinent positives include medication refill . Pertinent negatives include no fever.    See HPI for further details.     PAST MEDICAL HISTORY   has a past medical history of Congestive heart failure (HCC); Hypertension; Neuropathy (HCC); Spinal stenosis; and Stroke (HCC).    SURGICAL HISTORY   has a past surgical history that includes gyn surgery; appendectomy; cholecystectomy; and open reduction.    SOCIAL HISTORY  Social History   Substance Use Topics   • Smoking status: Never Smoker   • Smokeless tobacco: Never Used   • Alcohol use No      History   Drug Use No       FAMILY HISTORY  Family History   Problem Relation Age of Onset   • Cancer Mother    • Heart Disease Father    • Heart Disease Brother        CURRENT MEDICATIONS  Home Medications     Reviewed by Bren Perla R.N. (Registered Nurse) on 01/03/19 at 1832  Med List Status: Complete   Medication Last Dose Status   DILTIAZem (CARDIZEM) 60 MG Tab  "1/3/2019 Active   DULoxetine (CYMBALTA) 60 MG Cap DR Particles delayed-release capsule 1/3/2019 Active   levothyroxine (SYNTHROID) 175 MCG Tab 1/3/2019 Active   liothyronine (CYTOMEL) 5 MCG Tab 1/3/2019 Active   oxycodone (OXY-IR) 15 MG immediate release tablet 1/3/2019 Active   rivaroxaban (XARELTO) 20 MG Tab tablet 1/3/2019 Active                ALLERGIES  Allergies   Allergen Reactions   • Cillins [Penicillins]      swollen   • Gabapentin Hives   • Lyrica Hives   • Phenergan [Promethazine]    • Toradol      welts   • Tramadol      welts   • Tylenol      welts       PHYSICAL EXAM  VITAL SIGNS: /81   Pulse (!) 102   Temp 36.9 °C (98.5 °F) (Temporal)   Resp 18   Ht 1.651 m (5' 5\")   Wt (!) 166.7 kg (367 lb 8.1 oz)   SpO2 93%   BMI 61.16 kg/m²     Constitutional: Well developed, Well nourished,no acute distress, Non-toxic appearance.   HENT: Normocephalic, Atraumatic.  Oropharynx moist.   Eyes: PERRL, EOMI, Conjunctiva normal, No discharge.   Neck: no anterior cervical lymphadenopathy  CV: Good pulses  Thorax & Lungs: No respiratory distress.   Abdomen:  Soft, non-tender.  No rebound, no peritoneal signs.   Skin: Warm, Dry, No erythema, No rash.    Musculoskeletal: No major deformities noted.   Neurologic: Awake, alert. Moves all extremities spontaneously.  Psychiatric: Affect normal, Mood normal.     COURSE & MEDICAL DECISION MAKING  Nursing notes, VS, PMSFHx reviewed in chart.    7:33 PM - Patient seen and examined at bedside. I informed her that I can give her a dose of pain medication here in the ED and send her home with a prescription for only a few days. I told her she will need to find a sooner appointment with her primary care provider for further medication refill. The patient understood and agreed. She was stable for discharge at this time.     I reviewed prescription monitoring program for patient's narcotic use before prescribing a scheduled drug.The patient will not drink alcohol nor drive " with prescribed medications. The patient will return for new or worsening symptoms and is stable at the time of discharge.    The patient is referred to a primary physician for blood pressure management, diabetic screening, and for all other preventative health concerns.    In prescribing controlled substances to this patient, I certify that I have obtained and reviewed the medical history of Christy Najera. I have also made a good anyi effort to obtain applicable records from other providers who have treated the patient and records did not demonstrate any increased risk of substance abuse that would prevent me from prescribing controlled substances.     I have conducted a physical exam and documented it. I have reviewed Ms. Najera’s prescription history as maintained by the [place] Prescription Monitoring Program.     I have assessed the patient’s risk for abuse, dependency, and addiction using the validated Opioid Risk Tool available at https://www.mdcalc.com/ebfzms-mayc-emvj-ort-narcotic-abuse.     Given the above, I believe the benefits of controlled substance therapy outweigh the risks. The reasons for prescribing controlled substances include non-narcotic, oral analgesic alternatives have been inadequate for pain control. Accordingly, I have discussed the risk and benefits, treatment plan, and alternative therapies with the patient.   I will prescribed a couple of days worth of her usual oxycodone, since she was not discharged on any pain medication and is chronically on that.  She was thought to have an appointment with her primary today but that was not apparently accurate I told her I cannot prescribe pain medication for a longer period of time.  However given the fact that she is on this chronically and has not had an exacerbation of her pain I think it is reasonable to prescribe a short course until she can see her physician hopefully tomorrow.  DISPOSITION:  Patient will be discharged home in  stable condition.    FOLLOW UP:  Ramez Raya M.D.  [address]  [address] [id number] [phone]            OUTPATIENT MEDICATIONS:  New Prescriptions    OXYCODONE (OXY-IR) 15 MG IMMEDIATE RELEASE TABLET    Take 1 Tab by mouth every 8 hours as needed for Severe Pain for up to 3 days.     FINAL IMPRESSION  1. Medication refill    2. Pain in both lower extremities          IMello (Scribe), am scribing for, and in the presence of, Jamie Perez M.D..    Electronically signed by: Mello Escalante (Scribe), 1/3/2019    IJamie M.D. personally performed the services described in this documentation, as scribed by Mello Escalante in my presence, and it is both accurate and complete. E.     The note accurately reflects work and decisions made by me.  Jamie Perez  1/3/2019  9:10 PM

## 2019-01-04 NOTE — PROGRESS NOTES
I called patient today to discuss concerns about her Urinalysis and recent visits to the ED.     I saw this patient on 12/24/2018, patient had recently been discharged for a recent fall.  No fractures were noted on exam and or on imaging.  Patient was referred to our clinic for follow-up.  On talking to patient, she stated that she recently moved from [facility] and she was being treated for chronic pain there.  She states that her regimen at that time was 20 mg of oxycodone 3 times daily for the last 3 years.  However, patient did not have any documentation of said treatment.  At that point we decided to give patient enough medication for 11 days until which point she could follow up with the pain clinic, we prescribed oxycodone 15 mg 3 times daily.  We wanted her to follow-up with us after we obtained a urine drug screen.  The drug screen was drawn at the clinic that day and took about 1 week for the results to come back.  The Millenium test from 12/24/2018 was positive for Codeine, Morphine, hydromorphone, oxycodone Naroxycodone, Oxymorphone and Zolpidem.  After reviewing our records it was noted that the patient had not been prescribed morphine for her pain, nor was this administered in her hospitalization prior to being seen by me in the clinic.  After review of her hospital records, by myself and colleagues, it was noted that morphine had not been administered at any point.    Following our visit on 12/24/2018 the patient presented to the emergency department on 12/31/2018 for leg swelling.  During this hospitalization the patient did receive opiates for pain relief.  Therefore, the patient should not of been taking her home oxycodone during this time.    The patient presented to the clinic following her discharge, she was requesting a refill of her oxycodone.  However, the patient should have had enough medication to last her until 1/4/2019 had she not been hospitalized, but the patient was hospitalized  and therefore should not have been taking her home medications.  Therefore, the patient was requesting a refill too soon.  During this clinic encounter the patient became angry and staff had to call security.  Patient finally left angry about not receiving her refill.    She then proceeded to return to the emergency department requesting prescription refills.    Based on these findings, the UA, recent admissions, and requesting early refills; concern was brought up about substance abuse and substance seeking behaviors.    I called the patient today, to talk to her about these behaviors and her urine results.  Patient states that we gave her morphine during 1 of her hospitalizations, I rechecked through the records and did not see that morphine was ever administered to the patient.  Patient also did not mention that she had been taking this before.  Of note, the UA revealed a measure result of morphine of greater than 100,000. I asked the patient how many oxycodones she had left from when I had prescribed them to her, she stated that she had enough just for today.  I discussed with her that this should not be the case, especially since she was recently hospitalized and should not of been taking her home medications.  Patient did not say anything about this, and claimed that we had administered morphine and 1 of her hospitalizations.  I spoke to her and mentioned that we would not be able to prescribe her opiate medications, I offered her support and numbers to substance abuse programs around town.  Patient stated that she already had numbers and information about substance abuse programs. The patient was not happy with our decision and was adamant that we had prescribed morphine to her. She said bye and hung up.    This note was created using voice recognition software. There may be unintended errors in spelling, grammar or content.

## 2019-01-04 NOTE — PROGRESS NOTES
Established Patient    Christy presents today with the following:    CC: Post hospitalization follow up asking for medications refill.     HPI:  49 yo female patient with PMH of morbid obesity, AFib, Hypothyroidism, Depression, MAMTA, Restless leg and chronic back pain(lumbar spine canal stenosis) with sciatica rt more than left leg. Patient recently got hospitalized ( 12/31/2018- 1/3/2019) for worsening of back pain following a fall at home after her leg gave away she landed on her back. MRI was done showed no acute changes compared to her baseline findings. Her pain was managed with Dilaudid and Oxycodone. Patient got discharged today to our clinic to get Oxycodone refilled ( she was told to get Oxycodone refilled by her PCP, primary inpatient provider during this recent hospitalization refused to refill Oxycodone). Last visit with her PCP 12/24/2018 she was prescribed Oxycodone 33 tablets for 11 days after she signed controlled substance agreement. Millenium test 12/24/2018 positive for Codeine, Morphine, hydromorphone, oxycodone Naroxycodone, Oxymorphone and Zolpidem. Chart reviewed after asked about Morphine she denied taking it and referred to may be one of admissions she had recently, Per our charts no Morphine prescribed. Also she takes SOMA ( Carisoprodol ) for restless leg syndrome she asked for refill today.     Patient Active Problem List    Diagnosis Date Noted   • Pain in both lower extremities 12/24/2018     Priority: High   • Leg weakness 12/10/2018     Priority: High   • Weakness of both lower extremities 01/01/2019     Priority: Medium   • Obstructive sleep apnea syndrome 12/13/2018     Priority: Medium   • Hypoxia 12/13/2018     Priority: Medium   • Hypothyroidism 12/10/2018     Priority: Medium   • Falls 01/01/2019     Priority: Low   • Morbid obesity due to excess calories (HCC) 01/01/2019     Priority: Low   • AF (atrial fibrillation) (HCC) 12/10/2018     Priority: Low   • History of DVT  "(deep vein thrombosis) 12/10/2018     Priority: Low   • Insomnia 12/10/2018     Priority: Low   • Depression 12/10/2018     Priority: Low   • Restless leg 12/24/2018       Current Outpatient Prescriptions   Medication Sig Dispense Refill   • liothyronine (CYTOMEL) 5 MCG Tab Take 1 Tab by mouth every day. 30 Tab 1   • DILTIAZem (CARDIZEM) 60 MG Tab Take 1 Tab by mouth 2 Times a Day. 120 Tab 1   • DULoxetine (CYMBALTA) 60 MG Cap DR Particles delayed-release capsule Take 1 Cap by mouth every day. 90 Cap 1   • levothyroxine (SYNTHROID) 175 MCG Tab Take 1 Tab by mouth Every morning on an empty stomach. 90 Tab 1   • oxycodone (OXY-IR) 15 MG immediate release tablet Take 1 Tab by mouth every 8 hours as needed for up to 11 days. 33 Tab 0   • rivaroxaban (XARELTO) 20 MG Tab tablet Take 1 Tab by mouth every day. 30 Tab 0     No current facility-administered medications for this visit.        ROS: As per HPI. Additional pertinent systems as noted below.    Constitutional: No fever or chills   Review of Systems   Constitutional: Positive for malaise/fatigue.   Respiratory: Negative.    Cardiovascular: Negative.    Musculoskeletal: Positive for back pain, falls and joint pain. Negative for myalgias and neck pain.   Skin: Negative.        /68 (BP Location: Right arm, Patient Position: Sitting, BP Cuff Size: Adult long)   Pulse (!) 110   Temp 36.9 °C (98.4 °F) (Temporal)   Ht 1.651 m (5' 5\")   Wt (!) 166 kg (366 lb)   SpO2 97%   Breastfeeding? No   BMI 60.91 kg/m²     Physical Exam   Constitutional:  oriented to person, place, and time. No distress. Morbidly obese patient.   Eyes: Pupils are equal, round, and reactive to light. No scleral icterus.  Neck: Neck supple. No thyromegaly present.   Cardiovascular: Normal rate, regular rhythm and normal heart sounds.  Exam reveals no gallop and no friction rub.  No murmur heard.  Pulmonary/Chest: Breath sounds normal. Chest wall is not dull to percussion.  Back: tenderness " over lower lumbar spine.     Note: I have reviewed all pertinent labs and diagnostic tests associated with this visit with specific comments listed under the assessment and plan below    Assessment and Plan    1. Restless leg  - On Soma   - Declined refill today, as patient on Oxycodone at high risk of respiratory depression. D/C [facility].   - This provider called pharmacy to d/c Soma.     2. Chronic midline low back pain with right-sided sciatica  -  MRI was done showed no acute changes compared to her baseline findings.  -  Her pain was managed with Dilaudid and Oxycodone.   - Patient got discharged today to our clinic to get Oxycodone refilled ( she was told to get Oxycodone refilled by her PCP, primary inpatient provider during this recent hospitalization refused to refill Oxycodone).  -  Last visit with her PCP 12/24/2018 she was prescribed Oxycodone 33 tablets for 11 days after she signed controlled substance agreement.  -  Millenium test 12/24/2018 positive for Codeine, Morphine, hydromorphone, oxycodone Naroxycodone, Oxymorphone and Zolpidem.   - Chart reviewed after asked about Morphine she denied taking it and referred to may be one of admissions she had recently, Per our charts no Morphine prescribed.  - Patient still have some Oxycodone left over 9-10 tablets.   - Declined Oxycodone refill today as patient high risk for substance abuse/addiction. Couldn't justify Morphine in Millenium screen.   - Discussed with patient that we need to discuss it with / tomorrow and will call her. Meanwhile she can use left over Oxycodone. If worsening of pain she can present to ED.    3. Hypothyroidism, unspecified type  - Cytomel refilled today   - Patient has follow up appointment on 1/17/2019.   - TFT follow up.       Signed by: Uli Red M.D.

## 2019-01-14 ENCOUNTER — TELEPHONE (OUTPATIENT)
Dept: INTERNAL MEDICINE | Facility: MEDICAL CENTER | Age: 49
End: 2019-01-14

## 2022-03-03 NOTE — PROGRESS NOTES
Received call from Tamara at [facility] stating they detected Campylobacter in the GI pathogen test.   Campylobacter species Not Detected Detected Abnormal       Discussed with Dr Fuller, can wait until Dr Reddy comes in tomorrow and review with Dr Maureen QUILES.    Spoke with Dr. Jesus of team [facility] at 2200 to report that the pt states that she normally takes 20 mg of Oxycodone every 6 hours.  Also reported that the pt states that she is allergic to gabapentin.  Dr. Jesus said she will put in a one time dose of 10 mg of Oxycontin and follow up with the [facility] day team.

## 2023-09-16 NOTE — ED NOTES
@1130 15mg oxycodone.  Pt ambulated from wheelchair in doorway to bed. Pt walking with abnormal gait, but steady on feet.     Use a warm compress to the eye for 15 minutes 4 times a day to help drainage and ease discomfort. The compress should be as warm as you can tolerate comfortably. Be careful not to burn your eye.

## 2023-11-06 NOTE — DIETARY
NUTRITION SERVICES: BMI - Pt with BMI >40 (=52.09). Weight loss counseling not appropriate in acute care setting. RECOMMEND - Referral to outpatient nutrition services for weight management after D/C.     
Nutrition Services: Consult received for diet education, pt wants to lose wt. Diet= Regular. Provided written and verbal information on nutrition tips for wt loss with resources for follow up. Pt motivated to learn about diet and asked appropriate questions, expect good compliance. Pt with no questions/concerns. RD available PRN.     
No
